# Patient Record
Sex: FEMALE | Race: WHITE | NOT HISPANIC OR LATINO | Employment: OTHER | URBAN - METROPOLITAN AREA
[De-identification: names, ages, dates, MRNs, and addresses within clinical notes are randomized per-mention and may not be internally consistent; named-entity substitution may affect disease eponyms.]

---

## 2023-05-26 LAB
C. DIFFICILE TOXIN, PCR: NOT DETECTED
CAMPYLOBACTER GP: NOT DETECTED
NOROVIRUS GI/GII: NOT DETECTED
ROTAVIRUS A: NOT DETECTED
SALMONELLA SP.: NOT DETECTED
SHIGA TOXIN 1: NOT DETECTED
SHIGA TOXIN 2: NOT DETECTED
SHIGELLA SP.: NOT DETECTED
VIBRIO GRP.: NOT DETECTED
YERSINIA ENTEROCOLITICA: NOT DETECTED

## 2023-06-01 LAB
CRYPTOSPORIDIUM ANTIGEN-DATA CONVERSION: NEGATIVE
GIARDIA LAMBLIA AG-DATA CONVERSION: NEGATIVE
OVA + PARASITE EXAM: NEGATIVE

## 2023-10-02 DIAGNOSIS — I48.91 ATRIAL FIBRILLATION, UNSPECIFIED TYPE (MULTI): Primary | ICD-10-CM

## 2023-10-02 RX ORDER — METOPROLOL TARTRATE 50 MG/1
50 TABLET ORAL 2 TIMES DAILY
Qty: 180 TABLET | Refills: 3 | Status: SHIPPED | OUTPATIENT
Start: 2023-10-02 | End: 2024-10-01

## 2023-10-03 ENCOUNTER — PHARMACY VISIT (OUTPATIENT)
Dept: PHARMACY | Facility: CLINIC | Age: 83
End: 2023-10-03
Payer: MEDICARE

## 2023-10-03 PROCEDURE — RXMED WILLOW AMBULATORY MEDICATION CHARGE

## 2023-10-23 ENCOUNTER — HOSPITAL ENCOUNTER (OUTPATIENT)
Dept: RADIOLOGY | Facility: HOSPITAL | Age: 83
Discharge: HOME | End: 2023-10-23
Payer: MEDICARE

## 2023-10-23 DIAGNOSIS — Z12.31 ENCOUNTER FOR SCREENING MAMMOGRAM FOR MALIGNANT NEOPLASM OF BREAST: ICD-10-CM

## 2023-10-23 PROCEDURE — 77063 BREAST TOMOSYNTHESIS BI: CPT

## 2023-10-23 PROCEDURE — 77063 BREAST TOMOSYNTHESIS BI: CPT | Performed by: RADIOLOGY

## 2023-10-23 PROCEDURE — 77067 SCR MAMMO BI INCL CAD: CPT | Performed by: RADIOLOGY

## 2023-10-23 PROCEDURE — 77067 SCR MAMMO BI INCL CAD: CPT

## 2023-10-25 ENCOUNTER — LAB (OUTPATIENT)
Dept: LAB | Facility: LAB | Age: 83
End: 2023-10-25
Payer: MEDICARE

## 2023-10-25 ENCOUNTER — HOSPITAL ENCOUNTER (OUTPATIENT)
Dept: RADIOLOGY | Facility: HOSPITAL | Age: 83
Discharge: HOME | End: 2023-10-25
Payer: MEDICARE

## 2023-10-25 DIAGNOSIS — R10.2 PELVIC PAIN: ICD-10-CM

## 2023-10-25 DIAGNOSIS — M25.559 HIP PAIN: ICD-10-CM

## 2023-10-25 DIAGNOSIS — R10.30 ABDOMINAL PAIN, LOWER: Primary | ICD-10-CM

## 2023-10-25 LAB
CREAT SERPL-MCNC: 1.06 MG/DL (ref 0.5–1.05)
GFR SERPL CREATININE-BSD FRML MDRD: 52 ML/MIN/1.73M*2

## 2023-10-25 PROCEDURE — 74177 CT ABD & PELVIS W/CONTRAST: CPT

## 2023-10-25 PROCEDURE — 82565 ASSAY OF CREATININE: CPT

## 2023-10-25 PROCEDURE — 74177 CT ABD & PELVIS W/CONTRAST: CPT | Performed by: RADIOLOGY

## 2023-10-25 PROCEDURE — 36415 COLL VENOUS BLD VENIPUNCTURE: CPT

## 2023-10-25 PROCEDURE — 2550000001 HC RX 255 CONTRASTS

## 2023-10-25 RX ADMIN — IOHEXOL 75 ML: 350 INJECTION, SOLUTION INTRAVENOUS at 16:06

## 2023-10-28 ENCOUNTER — PHARMACY VISIT (OUTPATIENT)
Dept: PHARMACY | Facility: CLINIC | Age: 83
End: 2023-10-28
Payer: MEDICARE

## 2023-11-03 PROBLEM — M79.18 MYOFASCIAL PAIN SYNDROME: Status: ACTIVE | Noted: 2023-11-03

## 2023-11-03 PROBLEM — N18.31 CHRONIC KIDNEY DISEASE, STAGE 3A (MULTI): Status: ACTIVE | Noted: 2023-11-03

## 2023-11-03 PROBLEM — M48.062 NEUROGENIC CLAUDICATION DUE TO LUMBAR SPINAL STENOSIS: Status: ACTIVE | Noted: 2023-11-03

## 2023-11-03 PROBLEM — C54.1 ENDOMETRIAL CARCINOMA (MULTI): Status: ACTIVE | Noted: 2023-11-03

## 2023-11-03 PROBLEM — C44.311 BASAL CELL CARCINOMA OF SKIN OF NOSE: Status: ACTIVE | Noted: 2020-02-12

## 2023-11-03 PROBLEM — M81.0 AGE-RELATED OSTEOPOROSIS WITHOUT CURRENT PATHOLOGICAL FRACTURE: Status: ACTIVE | Noted: 2023-11-03

## 2023-11-03 PROBLEM — M54.16 RADICULOPATHY, LUMBAR REGION: Status: ACTIVE | Noted: 2023-11-03

## 2023-11-03 PROBLEM — R07.89 CHEST PRESSURE: Status: ACTIVE | Noted: 2023-11-03

## 2023-11-03 PROBLEM — I10 BENIGN HYPERTENSION: Status: ACTIVE | Noted: 2023-11-03

## 2023-11-03 PROBLEM — N94.9 ADNEXAL CYST: Status: ACTIVE | Noted: 2023-11-03

## 2023-11-03 PROBLEM — C54.9: Status: ACTIVE | Noted: 2023-11-03

## 2023-11-03 PROBLEM — I48.0 PAROXYSMAL ATRIAL FIBRILLATION (MULTI): Status: ACTIVE | Noted: 2023-11-03

## 2023-11-03 PROBLEM — Z85.828 PERSONAL HISTORY OF OTHER MALIGNANT NEOPLASM OF SKIN: Status: ACTIVE | Noted: 2020-02-12

## 2023-11-03 PROBLEM — M48.061 LUMBAR CANAL STENOSIS: Status: ACTIVE | Noted: 2023-11-03

## 2023-11-03 PROBLEM — R82.90 ABNORMAL URINE: Status: ACTIVE | Noted: 2023-11-03

## 2023-11-03 PROBLEM — G89.4 PAIN SYNDROME, CHRONIC: Status: ACTIVE | Noted: 2023-11-03

## 2023-11-03 PROBLEM — K21.9 GASTROESOPHAGEAL REFLUX DISEASE WITHOUT ESOPHAGITIS: Status: ACTIVE | Noted: 2017-10-30

## 2023-11-03 PROBLEM — M51.36 DEGENERATION OF L4-L5 INTERVERTEBRAL DISC: Status: ACTIVE | Noted: 2023-11-03

## 2023-11-03 PROBLEM — R94.31 ABNORMAL EKG: Status: ACTIVE | Noted: 2023-11-03

## 2023-11-03 PROBLEM — M51.369 DEGENERATION OF L4-L5 INTERVERTEBRAL DISC: Status: ACTIVE | Noted: 2023-11-03

## 2023-11-03 PROBLEM — E78.00 PURE HYPERCHOLESTEROLEMIA: Status: ACTIVE | Noted: 2023-11-03

## 2023-11-03 RX ORDER — GLUCOSAMINE/CHONDROITIN/C/MANG 500-400 MG
1 CAPSULE ORAL 2 TIMES DAILY
COMMUNITY

## 2023-11-03 RX ORDER — ASCORBIC ACID 500 MG
TABLET ORAL
COMMUNITY

## 2023-11-03 RX ORDER — NALOXONE HYDROCHLORIDE 4 MG/.1ML
SPRAY NASAL
COMMUNITY
Start: 2020-03-24

## 2023-11-03 RX ORDER — MULTIVITAMIN
TABLET ORAL
COMMUNITY

## 2023-11-03 RX ORDER — HYDROCODONE BITARTRATE AND ACETAMINOPHEN 5; 325 MG/1; MG/1
1 TABLET ORAL 2 TIMES DAILY PRN
COMMUNITY
End: 2023-11-07 | Stop reason: SDUPTHER

## 2023-11-03 RX ORDER — NIRMATRELVIR AND RITONAVIR 300-100 MG
2 KIT ORAL 2 TIMES DAILY
COMMUNITY
Start: 2022-06-27

## 2023-11-03 RX ORDER — PANTOPRAZOLE SODIUM 40 MG/1
40 TABLET, DELAYED RELEASE ORAL DAILY
COMMUNITY

## 2023-11-03 RX ORDER — MAGNESIUM GLUCONATE 27.5 (500)
55 TABLET ORAL 2 TIMES DAILY
COMMUNITY
Start: 2021-01-12

## 2023-11-03 RX ORDER — PROGESTERONE 100 MG/1
1 CAPSULE ORAL DAILY
COMMUNITY
Start: 2010-12-15

## 2023-11-03 RX ORDER — SPIRONOLACTONE 25 MG/1
1 TABLET ORAL DAILY
COMMUNITY
Start: 2019-01-02

## 2023-11-03 RX ORDER — ZINC GLUCONATE 100 MG
1 TABLET ORAL DAILY
COMMUNITY

## 2023-11-03 RX ORDER — CLOTRIMAZOLE AND BETAMETHASONE DIPROPIONATE 10; .64 MG/G; MG/G
1 CREAM TOPICAL 2 TIMES DAILY
COMMUNITY
Start: 2023-05-25

## 2023-11-03 RX ORDER — CALCIUM CARBONATE/VITAMIN D3 600MG-5MCG
1 TABLET ORAL 2 TIMES DAILY
COMMUNITY

## 2023-11-03 RX ORDER — ACETAMINOPHEN 325 MG/1
650 TABLET ORAL EVERY 4 HOURS PRN
COMMUNITY

## 2023-11-03 RX ORDER — PANTOPRAZOLE SODIUM 20 MG/1
20 TABLET, DELAYED RELEASE ORAL
COMMUNITY

## 2023-11-03 RX ORDER — ESTRADIOL 1 MG/1
1 TABLET ORAL DAILY
COMMUNITY
Start: 2010-12-15

## 2023-11-03 RX ORDER — METHOCARBAMOL 750 MG/1
750 TABLET, FILM COATED ORAL 3 TIMES DAILY PRN
COMMUNITY
Start: 2018-05-07 | End: 2023-12-18 | Stop reason: SDUPTHER

## 2023-11-06 NOTE — PROGRESS NOTES
Location of Pain:  Low Back   Pain medication prescribed by us: Norco 5 mg BID  Pain medication prescribed by other provider: no    Any adverse effects from medication: sleepiness  Average pain score with medication (0-10):     1    Percent effective: 90%  Do you take medicine exactly as prescribed? Yes   Functional status (work, disability, retired, etc): retired   Ability to manage activities of daily living: yes   Overall quality of family/social life with the current treatment (below average, average or above average): above average  ER visit since last office visit: no  New medical issues since last office visit: no  Participating in (PT, Chiropractor, Tens Unit, Acupuncture, Aqua Therapy, Home Exercise): active    Last Controlled Substance Contract date: 05/02/2023  Last toxicology date:          08/072023         Consistent with prescribed meds: yes   OARRS reviewed: yes   Daily MME:  10.00  Yearly Narcan prescribed: 2023

## 2023-11-06 NOTE — PROGRESS NOTES
"Patient ID: Chel Lowe is a 83 y.o. female.  Referring Physician: No referring provider defined for this encounter.  Primary Care Provider: Edward Rogers MD    Subjective    Interval History:  She sprained her ankle is using a cane, improving, bowel movements and appetite are normal, otherwise she is doing well.        Past medical history:  Hypertension  Osteoporosis  Osteoarthritis  Mild renal insufficiency (GFR 57 in 2020)     Surgical history:   ×1  Removal of parathyroid gland , benign  Abdominoplasty   Shoulder and elbow surgeries.     OB/GYN history:  Hormone replacement therapy beginning in  and continuing to the present time.  Presently on estradiol 1 mg daily, takes 200 mg of progesterone days 1-12 each month.     No recalled history of abnormal Pap smears.     Family history:  No family history of cancer.  She is an only child.    Objective    BSA: 1.77 meters squared  /79 (BP Location: Left arm, Patient Position: Sitting, BP Cuff Size: Adult)   Pulse 76   Temp 36.1 °C (97 °F) (Temporal)   Resp 17   Ht (S) 1.626 m (5' 4.02\")   Wt 69.7 kg (153 lb 10.6 oz)   SpO2 97%   BMI 26.36 kg/m²      Physical Exam  Constitutional:       General: She is not in acute distress.     Appearance: Normal appearance. She is normal weight. She is not toxic-appearing.   HENT:      Head: Normocephalic.      Mouth/Throat:      Mouth: Mucous membranes are moist.      Pharynx: Oropharynx is clear.   Eyes:      Extraocular Movements: Extraocular movements intact.      Conjunctiva/sclera: Conjunctivae normal.      Pupils: Pupils are equal, round, and reactive to light.   Cardiovascular:      Rate and Rhythm: Normal rate and regular rhythm.      Heart sounds: Normal heart sounds. No murmur heard.     No friction rub. No gallop.   Pulmonary:      Effort: Pulmonary effort is normal.      Breath sounds: Normal breath sounds. No wheezing or rhonchi.   Abdominal:      General: Abdomen is " flat. Bowel sounds are normal. There is no distension.      Palpations: Abdomen is soft.      Tenderness: There is no abdominal tenderness.   Genitourinary:     Comments: Normal external female genitalia without lesions or masses  Speculum exam: Smooth vagina without lesions or masses, radiation changes noted in the vagina.  Bimanual exam: smooth vagina without lesions or masses, surgically absent uterus, cervix, adnexa  Rectovaginal exam: Smooth rectovaginal septum without lesions or masses    Musculoskeletal:         General: No swelling. Normal range of motion.      Cervical back: Normal range of motion.   Skin:     General: Skin is warm and dry.   Neurological:      General: No focal deficit present.      Mental Status: She is alert and oriented to person, place, and time.   Psychiatric:         Mood and Affect: Mood normal.         Behavior: Behavior normal.         Performance Status:  Asymptomatic    Assessment/Plan     Oncology History Overview Note   Tumor History:   12/20: [facility] BSO, sentinel lymph nodes  Stage IA grade 3, serous, endometrial cancer.  9% invasion  Lymphatic vessel invasion present  MMR intact  Her2 expression negative  1/2021-2/2021: Chemo started, HSR to taxolx2 with change to abraxane     Endometrial carcinoma (CMS/HCC)   11/3/2023 Initial Diagnosis    Endometrial carcinoma (CMS/HCC)       83 y.o.  female with history of Stage IA grade 3, serous endometrial cancer s/p [facility]/BSO, SLN excision on 12/14/20 followed by adjuvant therapy with  2 cycles carboplatin and taxol and 1 cycle carboplatin and abraxane completed 2/23/21. She completed vaginal brachytherapy at [facility] on 4/8/21.      # Endometrial cancer  - Physical examination was within normal limits today, no evidence of recurrence   - We reviewed signs and symptoms of possible recurrence with the patient and she will call our office should she experience any of these  - Follow up in 6 months         Scribe Attestation  By signing my name  below, I, Mike George   attest that this documentation has been prepared under the direction and in the presence of Marjan Mercedes MD.     Provider Attestation - Scribe documentation    All medical record entries made by the Scribe were at my direction and personally dictated by me. I have reviewed the chart and agree that the record accurately reflects my personal performance of the history, physical exam, discussion and plan.    Marjan Mercedes MD

## 2023-11-07 ENCOUNTER — OFFICE VISIT (OUTPATIENT)
Dept: GYNECOLOGIC ONCOLOGY | Facility: CLINIC | Age: 83
End: 2023-11-07
Payer: MEDICARE

## 2023-11-07 ENCOUNTER — OFFICE VISIT (OUTPATIENT)
Dept: PAIN MEDICINE | Facility: CLINIC | Age: 83
End: 2023-11-07
Payer: MEDICARE

## 2023-11-07 VITALS
BODY MASS INDEX: 26.23 KG/M2 | OXYGEN SATURATION: 97 % | TEMPERATURE: 97 F | DIASTOLIC BLOOD PRESSURE: 79 MMHG | HEART RATE: 76 BPM | SYSTOLIC BLOOD PRESSURE: 127 MMHG | WEIGHT: 153.66 LBS | RESPIRATION RATE: 17 BRPM | HEIGHT: 64 IN

## 2023-11-07 VITALS
SYSTOLIC BLOOD PRESSURE: 141 MMHG | HEIGHT: 64 IN | DIASTOLIC BLOOD PRESSURE: 74 MMHG | HEART RATE: 74 BPM | BODY MASS INDEX: 26.61 KG/M2 | RESPIRATION RATE: 16 BRPM

## 2023-11-07 DIAGNOSIS — Z79.891 ENCOUNTER FOR LONG-TERM OPIATE ANALGESIC USE: ICD-10-CM

## 2023-11-07 DIAGNOSIS — M51.26 DISPLACEMENT OF LUMBAR INTERVERTEBRAL DISC WITHOUT MYELOPATHY: ICD-10-CM

## 2023-11-07 DIAGNOSIS — M51.36 DEGENERATION OF L4-L5 INTERVERTEBRAL DISC: ICD-10-CM

## 2023-11-07 DIAGNOSIS — M81.0 AGE-RELATED OSTEOPOROSIS WITHOUT CURRENT PATHOLOGICAL FRACTURE: Primary | ICD-10-CM

## 2023-11-07 DIAGNOSIS — Z85.42 ENCOUNTER FOR FOLLOW-UP SURVEILLANCE OF UTERINE CANCER: ICD-10-CM

## 2023-11-07 DIAGNOSIS — M48.062 NEUROGENIC CLAUDICATION DUE TO LUMBAR SPINAL STENOSIS: ICD-10-CM

## 2023-11-07 DIAGNOSIS — Z08 ENCOUNTER FOR FOLLOW-UP SURVEILLANCE OF UTERINE CANCER: ICD-10-CM

## 2023-11-07 DIAGNOSIS — C54.1 ENDOMETRIAL CARCINOMA (MULTI): Primary | ICD-10-CM

## 2023-11-07 DIAGNOSIS — M47.816 LUMBAR SPONDYLOSIS: ICD-10-CM

## 2023-11-07 PROCEDURE — 1160F RVW MEDS BY RX/DR IN RCRD: CPT | Performed by: STUDENT IN AN ORGANIZED HEALTH CARE EDUCATION/TRAINING PROGRAM

## 2023-11-07 PROCEDURE — 99213 OFFICE O/P EST LOW 20 MIN: CPT | Performed by: NURSE PRACTITIONER

## 2023-11-07 PROCEDURE — 1159F MED LIST DOCD IN RCRD: CPT | Performed by: NURSE PRACTITIONER

## 2023-11-07 PROCEDURE — 1036F TOBACCO NON-USER: CPT | Performed by: STUDENT IN AN ORGANIZED HEALTH CARE EDUCATION/TRAINING PROGRAM

## 2023-11-07 PROCEDURE — 3078F DIAST BP <80 MM HG: CPT | Performed by: NURSE PRACTITIONER

## 2023-11-07 PROCEDURE — 1160F RVW MEDS BY RX/DR IN RCRD: CPT | Performed by: NURSE PRACTITIONER

## 2023-11-07 PROCEDURE — 99214 OFFICE O/P EST MOD 30 MIN: CPT | Mod: PO | Performed by: STUDENT IN AN ORGANIZED HEALTH CARE EDUCATION/TRAINING PROGRAM

## 2023-11-07 PROCEDURE — 3077F SYST BP >= 140 MM HG: CPT | Performed by: NURSE PRACTITIONER

## 2023-11-07 PROCEDURE — 1126F AMNT PAIN NOTED NONE PRSNT: CPT | Performed by: STUDENT IN AN ORGANIZED HEALTH CARE EDUCATION/TRAINING PROGRAM

## 2023-11-07 PROCEDURE — 3078F DIAST BP <80 MM HG: CPT | Performed by: STUDENT IN AN ORGANIZED HEALTH CARE EDUCATION/TRAINING PROGRAM

## 2023-11-07 PROCEDURE — 3074F SYST BP LT 130 MM HG: CPT | Performed by: STUDENT IN AN ORGANIZED HEALTH CARE EDUCATION/TRAINING PROGRAM

## 2023-11-07 PROCEDURE — 99214 OFFICE O/P EST MOD 30 MIN: CPT | Performed by: STUDENT IN AN ORGANIZED HEALTH CARE EDUCATION/TRAINING PROGRAM

## 2023-11-07 PROCEDURE — 1159F MED LIST DOCD IN RCRD: CPT | Performed by: STUDENT IN AN ORGANIZED HEALTH CARE EDUCATION/TRAINING PROGRAM

## 2023-11-07 PROCEDURE — 1125F AMNT PAIN NOTED PAIN PRSNT: CPT | Performed by: NURSE PRACTITIONER

## 2023-11-07 PROCEDURE — 1036F TOBACCO NON-USER: CPT | Performed by: NURSE PRACTITIONER

## 2023-11-07 RX ORDER — HYDROCODONE BITARTRATE AND ACETAMINOPHEN 5; 325 MG/1; MG/1
1 TABLET ORAL 2 TIMES DAILY PRN
Qty: 56 TABLET | Refills: 0 | Status: SHIPPED | OUTPATIENT
Start: 2023-12-05 | End: 2024-01-29 | Stop reason: SDUPTHER

## 2023-11-07 RX ORDER — HYDROCODONE BITARTRATE AND ACETAMINOPHEN 5; 325 MG/1; MG/1
1 TABLET ORAL 2 TIMES DAILY PRN
Qty: 56 TABLET | Refills: 0 | Status: SHIPPED | OUTPATIENT
Start: 2024-01-02 | End: 2024-04-23 | Stop reason: SDUPTHER

## 2023-11-07 RX ORDER — HYDROCODONE BITARTRATE AND ACETAMINOPHEN 5; 325 MG/1; MG/1
1 TABLET ORAL 2 TIMES DAILY PRN
Qty: 56 TABLET | Refills: 0 | Status: SHIPPED | OUTPATIENT
Start: 2023-11-07 | End: 2024-01-29 | Stop reason: SDUPTHER

## 2023-11-07 ASSESSMENT — ENCOUNTER SYMPTOMS
WEAKNESS: 1
OCCASIONAL FEELINGS OF UNSTEADINESS: 0
HEADACHES: 0
TREMORS: 0
PSYCHIATRIC NEGATIVE: 1
LIGHT-HEADEDNESS: 0
BACK PAIN: 1
MYALGIAS: 1
SEIZURES: 0
ALLERGIC/IMMUNOLOGIC NEGATIVE: 1
CONSTITUTIONAL NEGATIVE: 1
FACIAL ASYMMETRY: 0
DIZZINESS: 0
CARDIOVASCULAR NEGATIVE: 1
ARTHRALGIAS: 1
SPEECH DIFFICULTY: 0
JOINT SWELLING: 1
HEMATOLOGIC/LYMPHATIC NEGATIVE: 1
NUMBNESS: 0
RESPIRATORY NEGATIVE: 1
ENDOCRINE NEGATIVE: 1
DEPRESSION: 0
GASTROINTESTINAL NEGATIVE: 1
LOSS OF SENSATION IN FEET: 0

## 2023-11-07 ASSESSMENT — PAIN SCALES - GENERAL
PAINLEVEL: 0-NO PAIN
PAINLEVEL: 1

## 2023-11-07 NOTE — PROGRESS NOTES
Subjective   Patient ID: Chel Lowe is a 83 y.o. female.  HPI    Chel follows up for interval reevaluation of her low back pain from lumbar degenerative disc disease and lumbar stenosis.     Norco 5 mg twice daily as needed helps to reduce the low pain improve the function up to 90 %. Average pain score the medication is 1 out of 10 and it is a dull pain to the low back. She does have intermittent paresthesias to the left anterolateral thigh and is unsure how or when this comes on. Low back pain increases with prolonged activity of standing and sitting and improves with position change in medication.     She occasionally has some sleepiness after taking the medication but that is to be expected. Otherwise she is able to manage her ADLs with improved function from the medication. She has an average quality of family and social life with current condition and treatment. Denies go to the ED since last visit for pain.     Does have a right ankle sprain due to a misstep a week ago.  Is able to weight-bear.  So far improving.     Toxicology consistent August 7, 2023.  Annual controlled substance agreement and opioid risk tool are completed and scanned into the chart May 2, 2023.     Chel is with history of 3 chemotherapy and 3 radiation treatments for endometrial cancer. She will be going to oncology for surveillance 4 times a year for the next 2 years. Reports she is doing very well today.     Denies any new back pain. Is without any pain to the legs, numbness weakness or tingling. No falls. No bowel or bladder changes.     Did go to the ED was hospitalized for SVT June 29 through June 30, 2022 and now is taking Xarelto for atrial fibrillation.     For continuity:   Given the patient's report of reduced pain and improved functional ability without adverse effects, it is reasonable to treat with narcotic medications. The terms of the opioid agreement as well as the potential risks and adverse effects of the patient's  medication regimen were discussed in detail. This includes if applicable due to dosage of medication permission to discuss and coordinate care with other treatment providers relevant to the patients condition. The patient verbalized understanding.      Risks and side effects of chronic opioid therapy including but not limited to tolerance, dependence, constipation, hyperalgesia, cognitive side effects, addiction and possible death due to overuse and or misuse were discussed. I also discussed that such medications when co-administered with other sedative agents including but not limited to alcohol, benzodiazepines, sedative hypnotics and illegal drugs could pose life threatening consequences including death. I also explained the impact that the administration of such medication has on a patient with obstructive sleep apnea and continued recommendations for use of apnea devices if ordered are prescribed by other physicians. In order to effectively and safely treat the pain, I also emphasized the importance of compliance with the treatment plan, as well as compliance with the terms of the opioid agreement, which was reviewed in detail. I explained the importance of being responsible with the medications and to take these only as prescribed, never in excess and never for reasons other than pain reduction. The patient was counseled on keeping the medications safe and locked away from children and other adults as well as disposal methods and options. The patient understood the risks and instructions.      I also discussed with the patient in detail that based on the clinical response to the opioid medications and improvements of activities of daily living, sleep, and work performance in light of compliance with the treatment plan we can continue this form of therapy for the above chronic pain. The goal and rationale used for current treatment with chronic opioid medication is to control the pain and alleviate disability  induced by the chronic pain condition noted above after failures of other non-opioid and nonpharmacological modalities to treat the chronic pain and the symptoms associated with have failed. The patient understood the goals in terms of the above treatment plan and had no further questions prior to leaving the office today.      Of note, the above-mentioned diagnoses/conditions and expected fluctuating nature of pain, and pain characteristic changes may lead to prolonged functional impairment requiring frequent and multiple reassessments with continued high level medical decision making. As noted, medication and medication management may require opiate therapy in excess of a routine less than 30 day medication requirement. The patient may require daily opiate therapy necessitating month-long prescription medication as noted above in order to perform activities of daily living and achieve acceptable quality of life with respect to their chronic pain condition for the foreseeable future. We monitor our patient's carefully through drug monitoring, medication counts, urine drug testing specific to their medication as well as a myriad of other substances and with frequent follow-ups with interval reassement of the chronic pain condition, its pathophysiology and prognosis.      The level of clinical decision making at this office visit is high due to high risks and complications including mortality and morbidity related to acute and chronic pain with respects to life, bodily function, and treatment. Risks and clinical decisions with respect to under treatment, failure to maintain adequate treatment, and/or overtreatment complications and outcomes were discussed with the patient with respect to their chronic pain conditions, interventional therapies, as well as the use of various medications including possible controlled/dangerous medications. The amount and complexity of reviewed data at this in subsequent office visits is  high given patient's fluctuating clinical presentation, laboratory and radiographic reports, prescription monitoring program data, and medication history as well as other relevant data as noted above. Pertinent negatives and positives data was used in consideration for the above-mentioned high complexity.       Given the patient's total MED, general use of daily opiates, or other coadministered medications in various classes the patient was offered a prescription for Narcan. I instructed the patient that it is important that patient fill this medication in order to demonstrate understanding of the gravity of possible side effects including respiratory depression and risk of overdose of this opiate load or medication combination. As such patient will be required to bring Narcan prescription to follow-up appointments as part of compliance with continued opiate care.      With respect to opiate induced constipation I discussed multiple ways to combat this problem including staying hydrated and taking over-the-counter medications such as Dulcolax, Miralax and Senna. If these treatments are not effective we could consider such medications as Amitiza, Linzess and Movantik.      Disclaimer: This note was transcribed using an audio transcription device. As such, minor errors may be present with regard to spelling, punctuation, and inadvertent word insertion. Please disregard such errors.     Results/Data  Dexa Bone Density Axial Skeleton 30Nov2022 02:15PM Non Ambulatory, Provider   Ordering Provider: CHRIS SEALS [id number]      Test Name Result Flag Reference   Dexa Bone Density Axial Skeleton ([facility] Only) (Report)       FINAL REPORT  Interpreted by: MARTHA MOHAN LEE, MD   11/30/22 23:18  =================================================================       Bone Density and Vertebral Assessment Report        =================================================================     Height:      65.0 in  Weight:      150.0  lb  Fractures:  Treatments:     -----------------------------------------------------------------     Indication: osteopenia; monitoring treatment;     Referring Provider: CHRIS SEALS     Study: Bone densitometry and vertebral deformity assessment      were performed.     Exam Date: November 30, 2022     Accession number: [id number]     Findings: Standard measurements were obtained utilizing a Dual   Energy X-ray Absorptiometry bone densitometer. Data obtained   includes planar bone density measurements over the Left Femur   and Lumbar Spine. Comparison of measured data and standardized   mean data for a young adult population (when peak bone mass   occurs) results in a T score. This represents the number of   standard deviations above or below the mean of a young adult   population. Comparison of measured data to standards from an   age adjusted population similarly yields a Z score.            Bone Density:  -----------------------------------------------------------------  Region          BMD  T-score Z-score  Classification  -----------------------------------------------------------------  AP Spine(L1-L4)     1.031  -0.1   2.6    Normal  Femoral Neck (Left)   0.577  -2.5   0.0    Osteoporosis  Total Hip (Left)     0.683  -2.1   0.1    Osteopenia  -----------------------------------------------------------------     World Health Organization criteria for BMD impression   classify patients as:   Normal (T-score at or above -1.0),   Osteopenia (T-score between -1.0 and -2.5), or   Osteoporosis (T-score at or below -2.5).      10-year Fracture Risk:  -----------------------------------------------------------------  FRAX not reported because:   Some T-score for Spine Total or Hip Total or Femoral Neck at   or below -2.5    Treated for osteoporosis  -----------------------------------------------------------------        Vertebral Deformity Assessment: Exam date  11/30/2022  -----------------------------------------------------------------  Vertebral Level              Impression  -----------------------------------------------------------------  T4                    Normal  T5                    Mild Wedge Deformity  T6                    Normal  T7                    Normal  T8                    Moderate Wedge   Deformity   T9                    Moderate Wedge   Deformity   T10                    Normal  T11                    Normal  T12                    Normal  L1                    Normal  L2                    Normal  L3                    Normal  L4                    Mild Biconcave   Deformity   -----------------------------------------------------------------  A spine fracture indicates 5X risk for subsequent spine fracture   and 2X risk for subsequent hip fracture.           Previous Exams:  -----------------------------------------------------------------  Region Exam   Age BMD  T-score BMD Change   BMD Change       Date     g/cm2     vs Baseline  vs Previous  -----------------------------------------------------------------  AP Spine (L1-L4)     11/30/2022 82 1.031  -0.1   -2.3%*     -2.3%*       11/18/2020 80 1.055   0.1                     Total Hip(Left)     11/30/2022 82 0.683  -2.1   -4.4%*     -4.4%*       11/18/2020 80 0.714  -1.9                  -----------------------------------------------------------------  *Denotes significance at 95% confidence level, LSC for AP Spine   = 0.022 g/cm2, LSC for Total Hip = 0.027 g/cm2          ImpressionThe patient has osteoporosis as determined by WHO   criteria. Based on the results of the patient?s bone density   assessment, the risk of future fracture increases approximately   two fold for each 1.0 SD decrease in T-score.   However, low BMD is not the only risk factor for a future   fragility fracture. Other clinical risk factors for   osteoporotic fracture should be considered in ascertaining this    patient?s future fracture risk including the patient?s age,   previous osteoporotic (fragility) fracture, estrogen   deficiency/hypogonadal, risk of falling, use of medications   implicated in bone loss (glucocorticoids), family history of   osteoporotic fracture, diseases and conditions associated with   bone loss, low body weight, smoking, high bone turnover, etc.   Combining low BMD and other clinical risk factors result in a   more precise assessment of future fracture risk. Secondary   causes for osteoporosis, such as osteomalacia, other metabolic   bone disorders, and diseases and conditions that may contribute   to accelerated bone loss may have to be considered depending on   the clinical situation.   A repeat bone density assessment should be considered in two   years.      Patient has a Mild Wedge Deformity fracture of vertebra T5.   Patient has a Moderate Wedge Deformity fracture of vertebra T8.   Patient has a Moderate Wedge Deformity fracture of vertebra T9.   Patient has a Mild Biconcave Deformity fracture of vertebra L4.         All images and detailed analysis are available on the    Radiology PACS.   Reported by: Emanuel on 11/30/2022 2:29:00 PM.     Electronically signed by: MARTHA MOHAN  11/30/22 23:18      MRI Knee without Contrast 05Jan2016 11:42AM Non Ambulatory, Provider   Ordering Provider: CHRIS SEALS [id number]      Test Name Result Flag Reference   MRI Knee without Contrast (Report)       Interpreted by: AFIA  01/05/16 17:11  EXAMINATION: MRI of the left knee     INDICATION: LT KNEE PAIN 5YEARS METAL RT THIGH.     COMPARISON: Left knee radiographs 12/1/2015.     TECHNIQUE: Multiplanar multisequence imaging of the left knee was   performed without use of intravenous gadolinium.     FINDINGS:      There is a complex tear of the posterior horn of the medial meniscus   with a small flap component displaced superiorly and medially. The   lateral meniscus appears intact. The cruciate and  collateral   ligaments are intact. The posterior lateral supporting structures   appear intact. The extensor mechanism appears intact.     There is tricompartmental thinning and fibrillation of the articular   cartilage without sizable full-thickness defect. Reactive marrow   signal changes are identified in the medial aspect of the medial   tibial plateau.     The bone marrow signal is within normal limits without evidence for   fracture or marrow replacing process. There is no dislocation.   There is no sizable joint effusion.     There is a very small Baker's cyst without evidence for rupture.   Mild prepatellar soft tissue edema is nonspecific.     IMPRESSION:     1. Complex tear of the posterior horn of the medial meniscus with   small flap component displaced superomedially.  2. Tricompartmental articular cartilage thinning and fibrillation   without sizable full-thickness defect.  Transcribed by: Eze SocialSci  Electronically signed by: AFIA  01/05/16 17:11      Xray Knee 3 View [id number] 04:03PM Non Ambulatory, Provider   Ordering Provider: CHRIS SEALS [id number]      Test Name Result Flag Reference   Xray Knee 3 View (Report)       Interpreted by: GERMAN CARTAGENA  12/02/15 09:10  X-ray left knee: 5 views.     CLINICAL: Pain     FINDINGS: Osteopenia is present. Spurring of patella and tibial   spines is seen. Calcifications of the medial and lateral menisci are   identified.     No fracture or dislocation is seen. No periosteal reaction is noted.     There is no evidence of suprapatellar effusion.     IMPRESSION:     Spurring of patella and tibial spines.     Chondrocalcinosis, which may be seen with CPPD arthropathy.     Osteopenia.  Transcribed by: Eze SocialSci  Electronically signed by: Eze Eyeotasindhu  12/02/15 09:10      MRI L Spine without Contrast 22Jan2013 04:12PM Brian Reyes      Test Name Result Flag Reference   MRI L Spine without Contrast (Report)       Interpreted  by: COLLINS HALL  01/23/13 13:08  MR scan of the lumbar spine dated 1/22/2013     Clinical history: Left lower extremity pain.     Protocol: T1 weighted, STIR and turbo T2 weighted sagittal and axial   images of the lumbar spine were obtained. No comparison studies.   Study interpreted [facility].     Findings: There is grade 1 anterolisthesis of L2 on L3. There is also   suggestion of very subtle anterolisthesis of L5 on S1 . Alignment is   otherwise intact. There are varying degrees of degenerative disc   space narrowing and hypertrophic spurring throughout the lumbar   spine, most severe at levels L3/4 and L4/5. Marrow signal is somewhat   mottled in appearance but otherwise within the range of normal. The   conus is normal in caliber and signal. The conus terminates at the   L1/2 level, which is normal. No focal abnormalities are appreciated   at the T12/L1 level. At the L1/L2 level, there is mild effacement of   the thecal sac by disc bulging, facet hypertrophy ligamentum flavum   hypertrophy. There is no significant canal stenosis. There is no   significant foraminal stenosis. At the L2/3 level, there is mild   canal stenosis caused by the previously noted anterolisthesis, disc   bulging, facet hypertrophy ligamentum flavum hypertrophy. The disc   bulge is asymmetric to the left. Some degree of annular disruption is   therefore not excluded. There is mild left foraminal encroachment. At   the L3/4 level, there is minimal canal stenosis caused by   circumferential disc bulging, facet hypertrophy and ligamentum flavum   hypertrophy. There is mild bilateral foraminal encroachment. At the   L4/5 level, there is circumferential disc bulging with a superimposed   small, broad-based central disc herniation. In addition, there is   mild ligamentum flavum hypertrophy at this level. There is mild   bilateral foraminal encroachment. At the L5/S1 level, there is mild   circumferential  disc bulging and spurring causing effacement of the   ventral thecal sac. There is also bilateral facet hypertrophy,   greater on the right than on the left. There is no significant   foraminal stenosis, however. Note is made of a 6 mm synovial cyst   arising from the right facet joint at this level. Cyst projects   laterally, however; it does not encroach upon the foramen or canal.     Impression: Mild to moderate degenerative changes throughout the   lumbar spine, including grade 1 anterolisthesis of L2 on L3 and,   possibly, minimal anterolisthesis of L5 on S1. Associated mild canal   stenosis at the L2/3 level. Minimal canal stenosis at the L3/4 level.   Small, broad-based central disc herniation superimposed on disc bulge   at the L4/5 level.  Transcribed by: Eze AdsIt  Electronically signed by: Eze Tresoritbrenda  01/23/13 13:08      Review of Systems   Constitutional: Negative.    Respiratory: Negative.     Cardiovascular: Negative.    Gastrointestinal: Negative.    Endocrine: Negative.    Genitourinary: Negative.    Musculoskeletal:  Positive for arthralgias, back pain, gait problem, joint swelling and myalgias.   Allergic/Immunologic: Negative.    Neurological:  Positive for weakness. Negative for dizziness, tremors, seizures, syncope, facial asymmetry, speech difficulty, light-headedness, numbness and headaches.   Hematological: Negative.    Psychiatric/Behavioral: Negative.         Objective   Physical Exam  Vitals and nursing note reviewed.   Constitutional:       Appearance: Normal appearance.   HENT:      Head: Normocephalic and atraumatic.   Cardiovascular:      Pulses: Normal pulses.   Pulmonary:      Effort: Pulmonary effort is normal. No respiratory distress.   Musculoskeletal:      Right lower leg: No edema.      Left lower leg: No edema.   Skin:     General: Skin is warm and dry.      Capillary Refill: Capillary refill takes 2 to 3 seconds.   Neurological:      Mental Status: She  is alert and oriented to person, place, and time.      Cranial Nerves: No cranial nerve deficit.      Motor: No weakness.      Gait: Gait abnormal.      Comments: Ambulates with antalgic gait and right-sided cane.   Psychiatric:         Mood and Affect: Mood normal.         Behavior: Behavior normal.         Assessment/Plan   Problem List Items Addressed This Visit             ICD-10-CM    Age-related osteoporosis without current pathological fracture - Primary M81.0    Relevant Medications    HYDROcodone-acetaminophen (Norco) 5-325 mg tablet    Neurogenic claudication due to lumbar spinal stenosis M48.062    Relevant Medications    HYDROcodone-acetaminophen (Norco) 5-325 mg tablet    Degeneration of L4-L5 intervertebral disc M51.36    Relevant Medications    HYDROcodone-acetaminophen (Norco) 5-325 mg tablet     Other Visit Diagnoses         Codes    Encounter for long-term opiate analgesic use     Z79.891    Relevant Medications    HYDROcodone-acetaminophen (Norco) 5-325 mg tablet (Start on 12/5/2023)    HYDROcodone-acetaminophen (Norco) 5-325 mg tablet (Start on 1/2/2024)    Displacement of lumbar intervertebral disc without myelopathy     M51.26    Relevant Medications    HYDROcodone-acetaminophen (Norco) 5-325 mg tablet (Start on 12/5/2023)    HYDROcodone-acetaminophen (Norco) 5-325 mg tablet (Start on 1/2/2024)    Lumbar spondylosis     M47.816    Relevant Medications    HYDROcodone-acetaminophen (Norco) 5-325 mg tablet (Start on 12/5/2023)    HYDROcodone-acetaminophen (Norco) 5-325 mg tablet (Start on 1/2/2024)          Follow-up 12 weeks.    Reviewed and approved by JENAE KAUFFMAN on 11/7/23 at 2:43 PM.

## 2023-11-14 ENCOUNTER — APPOINTMENT (OUTPATIENT)
Dept: PHYSICAL THERAPY | Facility: HOSPITAL | Age: 83
End: 2023-11-14
Payer: MEDICARE

## 2023-11-27 ENCOUNTER — PHARMACY VISIT (OUTPATIENT)
Dept: PHARMACY | Facility: CLINIC | Age: 83
End: 2023-11-27
Payer: MEDICARE

## 2023-11-27 PROCEDURE — RXMED WILLOW AMBULATORY MEDICATION CHARGE

## 2023-11-27 RX ORDER — PANTOPRAZOLE SODIUM 40 MG/1
40 TABLET, DELAYED RELEASE ORAL DAILY
Qty: 90 TABLET | Refills: 3 | OUTPATIENT
Start: 2022-12-07

## 2023-12-05 PROCEDURE — RXMED WILLOW AMBULATORY MEDICATION CHARGE

## 2023-12-08 ENCOUNTER — PHARMACY VISIT (OUTPATIENT)
Dept: PHARMACY | Facility: CLINIC | Age: 83
End: 2023-12-08
Payer: MEDICARE

## 2023-12-18 ENCOUNTER — TELEPHONE (OUTPATIENT)
Dept: PAIN MEDICINE | Facility: CLINIC | Age: 83
End: 2023-12-18
Payer: MEDICARE

## 2023-12-18 DIAGNOSIS — M79.18 MYOFASCIAL PAIN SYNDROME: Primary | ICD-10-CM

## 2023-12-18 DIAGNOSIS — M54.32 SCIATICA OF LEFT SIDE: ICD-10-CM

## 2023-12-18 RX ORDER — PREDNISONE 20 MG/1
20 TABLET ORAL 2 TIMES DAILY
Qty: 10 TABLET | Refills: 0 | Status: SHIPPED | OUTPATIENT
Start: 2023-12-18 | End: 2023-12-23

## 2023-12-18 RX ORDER — METHOCARBAMOL 750 MG/1
750 TABLET, FILM COATED ORAL 3 TIMES DAILY PRN
Qty: 90 TABLET | Refills: 2 | Status: SHIPPED | OUTPATIENT
Start: 2023-12-18 | End: 2024-03-17

## 2023-12-18 NOTE — TELEPHONE ENCOUNTER
Patient is having sciatic pain Lt. Leg buttocks to heel. Her next OV is with Dr RAJPUT 1-29. Wondering if a steroid could be called in to help till OV. Please call patient and advise  Tiara

## 2023-12-30 ENCOUNTER — LAB REQUISITION (OUTPATIENT)
Dept: LAB | Facility: HOSPITAL | Age: 83
End: 2023-12-30
Payer: MEDICARE

## 2023-12-30 ENCOUNTER — HOSPITAL ENCOUNTER (OUTPATIENT)
Dept: RADIOLOGY | Facility: HOSPITAL | Age: 83
Discharge: HOME | End: 2023-12-30
Payer: MEDICARE

## 2023-12-30 DIAGNOSIS — M25.561 RIGHT KNEE PAIN: ICD-10-CM

## 2023-12-30 DIAGNOSIS — E78.5 HYPERLIPIDEMIA, UNSPECIFIED: ICD-10-CM

## 2023-12-30 DIAGNOSIS — I10 ESSENTIAL (PRIMARY) HYPERTENSION: ICD-10-CM

## 2023-12-30 LAB
ALBUMIN SERPL BCP-MCNC: 3.9 G/DL (ref 3.4–5)
ALP SERPL-CCNC: 37 U/L (ref 33–136)
ALT SERPL W P-5'-P-CCNC: 21 U/L (ref 7–45)
ANION GAP SERPL CALC-SCNC: 13 MMOL/L (ref 10–20)
AST SERPL W P-5'-P-CCNC: 23 U/L (ref 9–39)
BASOPHILS # BLD AUTO: 0.03 X10*3/UL (ref 0–0.1)
BASOPHILS NFR BLD AUTO: 0.4 %
BILIRUB SERPL-MCNC: 1.1 MG/DL (ref 0–1.2)
BUN SERPL-MCNC: 18 MG/DL (ref 6–23)
CALCIUM SERPL-MCNC: 8.8 MG/DL (ref 8.6–10.3)
CHLORIDE SERPL-SCNC: 104 MMOL/L (ref 98–107)
CHOLEST SERPL-MCNC: 165 MG/DL (ref 0–199)
CHOLESTEROL/HDL RATIO: 3.4
CO2 SERPL-SCNC: 22 MMOL/L (ref 21–32)
CREAT SERPL-MCNC: 1.02 MG/DL (ref 0.5–1.05)
EOSINOPHIL # BLD AUTO: 0.22 X10*3/UL (ref 0–0.4)
EOSINOPHIL NFR BLD AUTO: 3.1 %
ERYTHROCYTE [DISTWIDTH] IN BLOOD BY AUTOMATED COUNT: 12.8 % (ref 11.5–14.5)
GFR SERPL CREATININE-BSD FRML MDRD: 55 ML/MIN/1.73M*2
GLUCOSE SERPL-MCNC: 112 MG/DL (ref 74–99)
HCT VFR BLD AUTO: 44.3 % (ref 36–46)
HDLC SERPL-MCNC: 48.4 MG/DL
HGB BLD-MCNC: 14.2 G/DL (ref 12–16)
IMM GRANULOCYTES # BLD AUTO: 0.03 X10*3/UL (ref 0–0.5)
IMM GRANULOCYTES NFR BLD AUTO: 0.4 % (ref 0–0.9)
LDLC SERPL CALC-MCNC: 92 MG/DL
LYMPHOCYTES # BLD AUTO: 1.96 X10*3/UL (ref 0.8–3)
LYMPHOCYTES NFR BLD AUTO: 27.5 %
MCH RBC QN AUTO: 32.1 PG (ref 26–34)
MCHC RBC AUTO-ENTMCNC: 32.1 G/DL (ref 32–36)
MCV RBC AUTO: 100 FL (ref 80–100)
MONOCYTES # BLD AUTO: 0.66 X10*3/UL (ref 0.05–0.8)
MONOCYTES NFR BLD AUTO: 9.3 %
NEUTROPHILS # BLD AUTO: 4.23 X10*3/UL (ref 1.6–5.5)
NEUTROPHILS NFR BLD AUTO: 59.3 %
NON HDL CHOLESTEROL: 117 MG/DL (ref 0–149)
NRBC BLD-RTO: 0 /100 WBCS (ref 0–0)
PLATELET # BLD AUTO: 177 X10*3/UL (ref 150–450)
POTASSIUM SERPL-SCNC: 4.6 MMOL/L (ref 3.5–5.3)
PROT SERPL-MCNC: 6.3 G/DL (ref 6.4–8.2)
RBC # BLD AUTO: 4.43 X10*6/UL (ref 4–5.2)
SODIUM SERPL-SCNC: 134 MMOL/L (ref 136–145)
TRIGL SERPL-MCNC: 124 MG/DL (ref 0–149)
VLDL: 25 MG/DL (ref 0–40)
WBC # BLD AUTO: 7.1 X10*3/UL (ref 4.4–11.3)

## 2023-12-30 PROCEDURE — 80061 LIPID PANEL: CPT

## 2023-12-30 PROCEDURE — 80053 COMPREHEN METABOLIC PANEL: CPT

## 2023-12-30 PROCEDURE — 73564 X-RAY EXAM KNEE 4 OR MORE: CPT | Mod: RT

## 2023-12-30 PROCEDURE — 73564 X-RAY EXAM KNEE 4 OR MORE: CPT | Mod: RIGHT SIDE | Performed by: RADIOLOGY

## 2023-12-30 PROCEDURE — 85025 COMPLETE CBC W/AUTO DIFF WBC: CPT

## 2024-01-16 ENCOUNTER — APPOINTMENT (OUTPATIENT)
Dept: PAIN MEDICINE | Facility: CLINIC | Age: 84
End: 2024-01-16
Payer: MEDICARE

## 2024-01-29 ENCOUNTER — OFFICE VISIT (OUTPATIENT)
Dept: PAIN MEDICINE | Facility: CLINIC | Age: 84
End: 2024-01-29
Payer: MEDICARE

## 2024-01-29 VITALS — HEART RATE: 81 BPM | DIASTOLIC BLOOD PRESSURE: 79 MMHG | SYSTOLIC BLOOD PRESSURE: 122 MMHG | RESPIRATION RATE: 16 BRPM

## 2024-01-29 DIAGNOSIS — M51.26 DISPLACEMENT OF LUMBAR INTERVERTEBRAL DISC WITHOUT MYELOPATHY: ICD-10-CM

## 2024-01-29 DIAGNOSIS — M81.0 AGE-RELATED OSTEOPOROSIS WITHOUT CURRENT PATHOLOGICAL FRACTURE: ICD-10-CM

## 2024-01-29 DIAGNOSIS — M51.36 DEGENERATION OF L4-L5 INTERVERTEBRAL DISC: ICD-10-CM

## 2024-01-29 DIAGNOSIS — Z79.891 ENCOUNTER FOR LONG-TERM OPIATE ANALGESIC USE: ICD-10-CM

## 2024-01-29 DIAGNOSIS — M48.062 NEUROGENIC CLAUDICATION DUE TO LUMBAR SPINAL STENOSIS: ICD-10-CM

## 2024-01-29 DIAGNOSIS — M47.816 LUMBAR SPONDYLOSIS: ICD-10-CM

## 2024-01-29 PROCEDURE — 3074F SYST BP LT 130 MM HG: CPT | Performed by: ANESTHESIOLOGY

## 2024-01-29 PROCEDURE — 1036F TOBACCO NON-USER: CPT | Performed by: ANESTHESIOLOGY

## 2024-01-29 PROCEDURE — 3078F DIAST BP <80 MM HG: CPT | Performed by: ANESTHESIOLOGY

## 2024-01-29 PROCEDURE — 99213 OFFICE O/P EST LOW 20 MIN: CPT | Performed by: ANESTHESIOLOGY

## 2024-01-29 PROCEDURE — 1126F AMNT PAIN NOTED NONE PRSNT: CPT | Performed by: ANESTHESIOLOGY

## 2024-01-29 RX ORDER — HYDROCODONE BITARTRATE AND ACETAMINOPHEN 5; 325 MG/1; MG/1
1 TABLET ORAL 2 TIMES DAILY PRN
Qty: 56 TABLET | Refills: 0 | Status: SHIPPED | OUTPATIENT
Start: 2024-01-31 | End: 2024-04-23 | Stop reason: SDUPTHER

## 2024-01-29 RX ORDER — HYDROCODONE BITARTRATE AND ACETAMINOPHEN 5; 325 MG/1; MG/1
1 TABLET ORAL 2 TIMES DAILY PRN
Qty: 56 TABLET | Refills: 0 | Status: SHIPPED | OUTPATIENT
Start: 2024-03-27 | End: 2024-04-23 | Stop reason: SDUPTHER

## 2024-01-29 RX ORDER — HYDROCODONE BITARTRATE AND ACETAMINOPHEN 5; 325 MG/1; MG/1
1 TABLET ORAL 2 TIMES DAILY PRN
Qty: 56 TABLET | Refills: 0 | Status: SHIPPED | OUTPATIENT
Start: 2024-02-28 | End: 2024-03-27

## 2024-01-29 ASSESSMENT — PAIN SCALES - GENERAL
PAINLEVEL_OUTOF10: 0 - NO PAIN
PAINLEVEL: 0-NO PAIN

## 2024-01-29 ASSESSMENT — PAIN - FUNCTIONAL ASSESSMENT: PAIN_FUNCTIONAL_ASSESSMENT: 0-10

## 2024-01-29 ASSESSMENT — ENCOUNTER SYMPTOMS
DEPRESSION: 0
LOSS OF SENSATION IN FEET: 0
OCCASIONAL FEELINGS OF UNSTEADINESS: 0

## 2024-01-29 NOTE — PROGRESS NOTES
Subjective   Patient ID: Chel Lowe is a 83 y.o. female who presents for Med Refill.  HPI    Ms. Lowe is a pleasant 83-year-old female presenting today for medication refill.  She complains of chronic left leg pain.  She states that the pain starts at the left buttock area radiates down to the knee.  She reports that she only experiences the pain she exerts herself, doing house chores.  Her pain is well-controlled with Norco.  5/325 mg twice daily and Robaxin 750 mg 3 times daily.  She reports that she bumped her right knee couple of months ago and had extensive bruising given that she takes Xarelto.  She saw her tabetic surgeon and received a right knee intra-articular steroid injection which provided relief for about a week, x-ray at that time showed no acute pathology.      Chel is with history of 3 chemotherapy and 3 radiation treatments for endometrial cancer. She will be going to oncology for surveillance 4 times a year for the next 2 years. Reports she is doing very well today.     Denies any new back pain. Is without any pain to the legs, numbness weakness or tingling. No falls. No bowel or bladder changes.                  OARRS:  No data recorded      Review of Systems     Current Outpatient Medications   Medication Instructions    acetaminophen (TYLENOL) 650 mg, oral, Every 4 hours PRN    ascorbic acid (Vitamin C) 500 mg tablet oral    calcium carbonate-vitamin D3 600 mg-5 mcg (200 unit) tablet 1 tablet, oral, 2 times daily    clotrimazole-betamethasone (Lotrisone) cream 1 Application, Topical, 2 times daily    estradiol (Estrace) 1 mg tablet 1 tablet, oral, Daily    glucosamine-chondroit-vit C-Mn 500-400 mg capsule 1 capsule, oral, 2 times daily    HYDROcodone-acetaminophen (Norco) 5-325 mg tablet 1 tablet, oral, 2 times daily PRN    HYDROcodone-acetaminophen (Norco) 5-325 mg tablet 1 tablet, oral, 2 times daily PRN    HYDROcodone-acetaminophen (Norco) 5-325 mg tablet 1 tablet, oral, 2 times daily  PRN    magnesium gluconate (Magonate) 27.5 mg magne- sium (500 mg) tablet 55 mg, oral, 2 times daily    methocarbamol (ROBAXIN) 750 mg, oral, 3 times daily PRN    metoprolol tartrate (Lopressor) 50 mg tablet TAKE 1 TABLET BY MOUTH TWO TIMES A DAY    METOPROLOL TARTRATE ORAL oral    multivitamin tablet oral    naloxone (Narcan) 4 mg/0.1 mL nasal spray nasal, Used as directed    nirmatrelvir-ritonavir (Paxlovid) 300 mg (150 mg x 2)-100 mg tablet therapy pack 2 tablets, oral, 2 times daily    pantoprazole (PROTONIX) 20 mg, oral, Daily RT    pantoprazole (PROTONIX) 40 mg, oral, Daily    pantoprazole (PROTONIX) 40 mg, oral, Daily    progesterone (Prometrium) 100 mg capsule 1 capsule, oral, Daily    risedronate (Actonel) 35 mg tablet TAKE 1 TABLET BY MOUTH ONCE WEEKLY- TAKE WITH A FULL GLASS OF WATER ON AN EMPTY STOMACH. DO NOT LIE DOWN, EAT OR DRINK FOR 30 MINUTES.    risedronate (Actonel) 35 mg tablet Take in morning with full glass of water on an empty stomach. No food, drink, meds, or lying down for 30 minutes after.    rivaroxaban (Xarelto) 20 mg tablet TAKE 1 TABLET BY MOUTH ONE TIME DAILY    spironolactone (Aldactone) 25 mg tablet 1 tablet, oral, Daily    spironolactone (ALDACTONE) 50 mg, oral, Daily    zinc gluconate 100 mg tablet 1 tablet, oral, Daily        Past Medical History:   Diagnosis Date    Endometrial cancer (CMS/HCC)         Past Surgical History:   Procedure Laterality Date     SECTION, CLASSIC  2013     Section    HYSTERECTOMY      SHOULDER SURGERY  2013    Shoulder Surgery        No family history on file.     No Known Allergies     Objective     Vitals:    24 1433   BP: 122/79   Pulse:    Resp:         Physical Exam    GENERAL EXAM  Vital Signs: Vital signs to include heart rate, respiration rate, blood pressure, and temperature were reviewed.  General Appearance:  Awake, alert, healthy appearing, well developed, No acute distress.  Head: Normocephalic without  evidence of head injury.  Neck: The appearance of the neck was normal without swelling with a midline trachea.  Eyes: The eyelids and eyebrows exhibited no abnormalities.  Pupils were not pin-point.  Sclera was without icterus.  Lungs: Respiration rhythm and depth was normal.  Respiratory movements were normal without labored breathing.  Cardiovascular: No peripheral edema was present.    Neurological: Patient was oriented to time, place, and person.  Speech was normal.  Balance, gait, and stance were unremarkable.    Psychiatric: Appearance was normal with appropriate dress.  Mood was euthymic and affect was normal.  Skin: Affected regions were without ecchymosis or skin lesions.  Musculoskeletal: Right knee has normal range of motion.          Assessment/Plan        83-year-old female with chronic left leg pain that is well-controlled with Norco 5-325 mg twice daily and Robaxin 750 mg 3 times daily.  Risks and benefits of medications were discussed with the patient.    Plan:  -Physical therapy referral for left knee.  -Prescription refill.  -We discussed repeating intra-articular steroid injection and hyaluronic acid injection we could possibly proceed with hyaluronic acid injection patient did note improvement after physical therapy.    This note was generated with the aid of dictation software, there may be typos despite my attempts at proofreading.

## 2024-01-30 ENCOUNTER — APPOINTMENT (OUTPATIENT)
Dept: PAIN MEDICINE | Facility: CLINIC | Age: 84
End: 2024-01-30
Payer: MEDICARE

## 2024-02-12 ENCOUNTER — PHARMACY VISIT (OUTPATIENT)
Dept: PHARMACY | Facility: CLINIC | Age: 84
End: 2024-02-12
Payer: COMMERCIAL

## 2024-02-12 PROCEDURE — RXMED WILLOW AMBULATORY MEDICATION CHARGE

## 2024-02-12 RX ORDER — PANTOPRAZOLE SODIUM 40 MG/1
40 TABLET, DELAYED RELEASE ORAL DAILY
Qty: 90 TABLET | Refills: 3 | Status: CANCELLED | OUTPATIENT
Start: 2024-02-12

## 2024-02-27 PROCEDURE — RXMED WILLOW AMBULATORY MEDICATION CHARGE

## 2024-02-29 ENCOUNTER — PHARMACY VISIT (OUTPATIENT)
Dept: PHARMACY | Facility: CLINIC | Age: 84
End: 2024-02-29
Payer: COMMERCIAL

## 2024-02-29 PROCEDURE — RXMED WILLOW AMBULATORY MEDICATION CHARGE

## 2024-03-04 RX ORDER — ASPIRIN 81 MG/1
TABLET ORAL
COMMUNITY
Start: 2019-01-02

## 2024-03-04 RX ORDER — GABAPENTIN 300 MG/1
CAPSULE ORAL
COMMUNITY
Start: 2021-01-21

## 2024-03-06 ENCOUNTER — TELEPHONE (OUTPATIENT)
Dept: PAIN MEDICINE | Facility: CLINIC | Age: 84
End: 2024-03-06

## 2024-03-06 ENCOUNTER — OFFICE VISIT (OUTPATIENT)
Dept: CARDIOLOGY | Facility: CLINIC | Age: 84
End: 2024-03-06
Payer: MEDICARE

## 2024-03-06 ENCOUNTER — APPOINTMENT (OUTPATIENT)
Dept: CARDIOLOGY | Facility: CLINIC | Age: 84
End: 2024-03-06
Payer: MEDICARE

## 2024-03-06 VITALS
HEART RATE: 76 BPM | BODY MASS INDEX: 26.12 KG/M2 | SYSTOLIC BLOOD PRESSURE: 121 MMHG | HEIGHT: 64 IN | OXYGEN SATURATION: 97 % | WEIGHT: 153 LBS | DIASTOLIC BLOOD PRESSURE: 73 MMHG

## 2024-03-06 DIAGNOSIS — Z01.810 PREOP CARDIOVASCULAR EXAM: Primary | ICD-10-CM

## 2024-03-06 DIAGNOSIS — I10 BENIGN HYPERTENSION: ICD-10-CM

## 2024-03-06 DIAGNOSIS — I48.0 PAROXYSMAL ATRIAL FIBRILLATION (MULTI): ICD-10-CM

## 2024-03-06 PROCEDURE — 3078F DIAST BP <80 MM HG: CPT | Performed by: STUDENT IN AN ORGANIZED HEALTH CARE EDUCATION/TRAINING PROGRAM

## 2024-03-06 PROCEDURE — 1036F TOBACCO NON-USER: CPT | Performed by: STUDENT IN AN ORGANIZED HEALTH CARE EDUCATION/TRAINING PROGRAM

## 2024-03-06 PROCEDURE — 1159F MED LIST DOCD IN RCRD: CPT | Performed by: STUDENT IN AN ORGANIZED HEALTH CARE EDUCATION/TRAINING PROGRAM

## 2024-03-06 PROCEDURE — 1126F AMNT PAIN NOTED NONE PRSNT: CPT | Performed by: STUDENT IN AN ORGANIZED HEALTH CARE EDUCATION/TRAINING PROGRAM

## 2024-03-06 PROCEDURE — 99215 OFFICE O/P EST HI 40 MIN: CPT | Performed by: STUDENT IN AN ORGANIZED HEALTH CARE EDUCATION/TRAINING PROGRAM

## 2024-03-06 PROCEDURE — 3074F SYST BP LT 130 MM HG: CPT | Performed by: STUDENT IN AN ORGANIZED HEALTH CARE EDUCATION/TRAINING PROGRAM

## 2024-03-06 NOTE — TELEPHONE ENCOUNTER
Patient wanted to have it noted in chart that she is having arthroscopic surgery at [facility] on Monday and some pain medication may be involved for after the surgery.

## 2024-03-06 NOTE — PROGRESS NOTES
Primary Care Physician: Edward Rogers MD   Date of Visit: 03/06/2024  2:20 PM EST  Type of Visit: follow up       Chief Complaint:  Chief Complaint   Patient presents with    New Patient Visit     Here for cardiac clearance for knee surgery        HPI  Chel Lowe 84 y.o. female with hx of PAF. SVT. HTN. Preserved LV systolic function. Normal stress nuc test 2019 follows with Dr. Hamilton, new to me here today for preop risk assessment prior to rt knee surgery for torn meniscus     She feels good and has no active cardiac symptoms  No afib recurrence   Bp is well controlled  Doing well on metoprolol  No dizziness or syncope  No chest pain or sob    Before her knee injury last nov she was able to go up and down stairs and walk with no issues now she uses a cane after th injury     No hx of stroke  No bleeding issues        Review of Systems   Review of Systems   12 points review of systems are negative expect for the above    Social History:  Social History     Socioeconomic History    Marital status:      Spouse name: Not on file    Number of children: Not on file    Years of education: Not on file    Highest education level: Not on file   Occupational History    Not on file   Tobacco Use    Smoking status: Never    Smokeless tobacco: Never   Substance and Sexual Activity    Alcohol use: Never    Drug use: Never    Sexual activity: Not on file   Other Topics Concern    Not on file   Social History Narrative    Not on file     Social Determinants of Health     Financial Resource Strain: Not on file   Food Insecurity: Not on file   Transportation Needs: Not on file   Physical Activity: Not on file   Stress: Not on file   Social Connections: Not on file   Intimate Partner Violence: Not on file   Housing Stability: Not on file        Past Medical History:  Past Medical History:   Diagnosis Date    Endometrial cancer (CMS/[facility])        Past Surgical History:  Past Surgical History:   Procedure Laterality Date     " SECTION, CLASSIC  2013     Section    HYSTERECTOMY      SHOULDER SURGERY  2013    Shoulder Surgery       Family History:  No family history on file.     Objective:       2023    10:19 AM 2023     2:50 PM 2023     1:39 PM 2023     2:46 PM 2024     2:26 PM 2024     2:33 PM 3/6/2024    12:11 PM   Vitals   Systolic 142 133 141 127 170 122 121   Diastolic 83 78 74 79 81 79 73   Heart Rate 72 71 74 76 81  76   Temp    36.1 °C (97 °F)      Resp 17 16 16 17 16     Height (in)   1.626 m (5' 4\") 1.626 m (5' 4.02\")    1.626 m (5' 4\")   Weight (lb)    153.66   153   BMI   26.61 kg/m2 26.36 kg/m2   26.26 kg/m2   BSA (m2)   1.78 m2 1.77 m2   1.77 m2   Visit Report   Report    Report Report    Report Report Report Report       Significant value    Multiple values from one day are sorted in reverse-chronological order      Constitutional:       Appearance: Healthy appearance. Not in distress.   Neck:      Vascular: No JVR. JVD normal.   Pulmonary:      Effort: Pulmonary effort is normal.      Breath sounds: Normal breath sounds. No wheezing. No rhonchi. No rales.   Chest:      Chest wall: Not tender to palpatation.   Cardiovascular:      PMI at left midclavicular line. Normal rate. Regular rhythm. Normal S1. Normal S2.       Murmurs: There is no murmur.      No gallop.  No click. No rub.   Pulses:     Intact distal pulses.   Edema:     Peripheral edema absent.   Abdominal:      General: Bowel sounds are normal.      Palpations: Abdomen is soft.      Tenderness: There is no abdominal tenderness.   Musculoskeletal: Normal range of motion.         General: No tenderness.   Skin:     General: Skin is warm and dry.   Neurological:      General: No focal deficit present.      Mental Status: Alert and oriented to person, place and time.     Allergies:  No Known Allergies    Medications:  Current Outpatient Medications   Medication Instructions    acetaminophen (TYLENOL) 650 mg, " oral, Every 4 hours PRN    ascorbic acid (Vitamin C) 500 mg tablet oral    aspirin 81 mg EC tablet oral, Daily RT    calcium carbonate-vitamin D3 600 mg-5 mcg (200 unit) tablet 1 tablet, oral, 2 times daily    clotrimazole-betamethasone (Lotrisone) cream 1 Application, Topical, 2 times daily    estradiol (Estrace) 1 mg tablet 1 tablet, oral, Daily    gabapentin (Neurontin) 300 mg capsule oral    glucosamine-chondroit-vit C-Mn 500-400 mg capsule 1 capsule, oral, 2 times daily    HYDROcodone-acetaminophen (Norco) 5-325 mg tablet 1 tablet, oral, 2 times daily PRN    HYDROcodone-acetaminophen (Norco) 5-325 mg tablet 1 tablet, oral, 2 times daily PRN    HYDROcodone-acetaminophen (Norco) 5-325 mg tablet 1 tablet, oral, 2 times daily PRN    [START ON 3/27/2024] HYDROcodone-acetaminophen (Norco) 5-325 mg tablet 1 tablet, oral, 2 times daily PRN    magnesium gluconate (Magonate) 27.5 mg magne- sium (500 mg) tablet 55 mg, oral, 2 times daily    methocarbamol (ROBAXIN) 750 mg, oral, 3 times daily PRN    metoprolol tartrate (Lopressor) 50 mg tablet TAKE 1 TABLET BY MOUTH TWO TIMES A DAY    METOPROLOL TARTRATE ORAL oral    multivitamin tablet oral    naloxone (Narcan) 4 mg/0.1 mL nasal spray nasal, Used as directed    nirmatrelvir-ritonavir (Paxlovid) 300 mg (150 mg x 2)-100 mg tablet therapy pack 2 tablets, oral, 2 times daily    pantoprazole (PROTONIX) 20 mg, oral, Daily RT    pantoprazole (PROTONIX) 40 mg, oral, Daily    pantoprazole (PROTONIX) 40 mg, oral, Daily    progesterone (Prometrium) 100 mg capsule 1 capsule, oral, Daily    risedronate (Actonel) 35 mg tablet TAKE 1 TABLET BY MOUTH ONCE WEEKLY- TAKE WITH A FULL GLASS OF WATER ON AN EMPTY STOMACH. DO NOT LIE DOWN, EAT OR DRINK FOR 30 MINUTES.    risedronate (Actonel) 35 mg tablet Take in morning with full glass of water on an empty stomach. No food, drink, meds, or lying down for 30 minutes after.    rivaroxaban (Xarelto) 20 mg tablet TAKE 1 TABLET BY MOUTH ONE TIME  DAILY    spironolactone (Aldactone) 25 mg tablet 1 tablet, oral, Daily    spironolactone (ALDACTONE) 50 mg, oral, Daily    zinc gluconate 100 mg tablet 1 tablet, oral, Daily        Labs and Imaging:     Lab Results   Component Value Date    WBC 7.1 12/30/2023    HGB 14.2 12/30/2023    HCT 44.3 12/30/2023     12/30/2023    CHOL 165 12/30/2023    TRIG 124 12/30/2023    HDL 48.4 12/30/2023    ALT 21 12/30/2023    AST 23 12/30/2023     (L) 12/30/2023    K 4.6 12/30/2023     12/30/2023    CREATININE 1.02 12/30/2023    BUN 18 12/30/2023    CO2 22 12/30/2023    TSH 2.23 01/30/2018         Echocardiogram:   Echocardiogram     [facility], [address] [place] [address]  Tel [phone] and Fax [phone]    TRANSTHORACIC ECHOCARDIOGRAM REPORT      Patient Name:     AMANUEL RAJPUT VAIBHAV Neves Physician:  [id number] Sis Hamilton MD  Study Date:       6/30/2022          Referring           ESA VELARDE  Physician:  MRN/PID:          [id number]           PCP:                Edward Rogers MD  Accession/Order#: [id number]          Department          [facility]  Location:  YOB: 1940           Fellow:  Gender:           F                  Nurse:  Admit Date:       6/29/2022          Sonographer:        Isabela Lawrence RVT,  RDMS, RDCS  Admission Status: Inpatient -        Additional Staff:  Routine  Height:           162.56 cm          CC Report to:       [facility]  Weight:           69.85 kg           Study Type:         Echocardiogram  BSA:              1.75 m2  Blood Pressure: 131 /75 mmHg    Diagnosis/ICD: R07.9-Chest pain, unspecified  Indication:    Chest Pain  Procedure/CPT: Echo Complete w Full [id number]    Patient History:  Pertinent History: HTN, Previous SVT and Chest Pain. GERD, Arrhythmia.    Study Detail: The following Echo studies were performed: 2D, M-Mode, Doppler and  color flow. The patient was awake.      PHYSICIAN  INTERPRETATION:  Left Ventricle: The left ventricular systolic function is normal, with an estimated ejection fraction of 55-60%. There are no regional wall motion abnormalities. The left ventricular cavity size is normal. Left ventricular diastolic filling was indeterminate.  Left Atrium: The left atrium is mildly dilated.  Right Ventricle: The right ventricle is normal in size. There is normal right ventricular global systolic function.  Right Atrium: The right atrium is normal in size.  Aortic Valve: The aortic valve is trileaflet. There is no evidence of aortic valve regurgitation. The peak instantaneous gradient of the aortic valve is 6.3 mmHg.  Mitral Valve: The mitral valve is normal in structure. There is no evidence of mitral valve regurgitation.  Tricuspid Valve: The tricuspid valve is structurally normal. There is mild tricuspid regurgitation.  Pulmonic Valve: The pulmonic valve is not well visualized. There is physiologic pulmonic valve regurgitation.  Pericardium: There is no pericardial effusion noted.  Aorta: The aortic root was not well visualized.      CONCLUSIONS:  1. The left ventricular systolic function is normal with a 55-60% estimated ejection fraction.    QUANTITATIVE DATA SUMMARY:  2D MEASUREMENTS:  Normal Ranges:  LAs:           3.65 cm   (2.7-4.0cm)  IVSd:          1.21 cm   (0.6-1.1cm)  LVPWd:         1.05 cm   (0.6-1.1cm)  LVIDd:         4.10 cm   (3.9-5.9cm)  LVIDs:         2.27 cm  LV Mass Index: 89.8 g/m2  LV % FS        44.7 %    LA VOLUME:  Normal Ranges:  LA Vol A4C:        48.2 ml    (22+/-6mL/m2)  LA Vol A2C:        54.6 ml  LA Vol BP:         52.9 ml  LA Vol Index A4C:  27.5ml/m2  LA Vol Index A2C:  31.2 ml/m2  LA Vol Index BP:   30.2 ml/m2  LA Area A4C:       16.5 cm2  LA Area A2C:       18.1 cm2  LA Major Axis A4C: 4.8 cm  LA Major Axis A2C: 5.1 cm  LA Volume Index:   30.3 ml/m2  LA Vol A4C:        45.1 ml  LA Vol A2C:        53.1 ml    RA VOLUME BY A/L METHOD:  Normal  Ranges:  RA Vol A4C:        31.2 ml    (8.3-19.5ml)  RA Vol Index A4C:  17.8 ml/m2  RA Area A4C:       13.0 cm2  RA Major Axis A4C: 4.6 cm    M-MODE MEASUREMENTS:  Normal Ranges:  Ao Root: 2.90 cm (2.0-3.7cm)  LAs:     3.96 cm (2.7-4.0cm)    AORTA MEASUREMENTS:  Normal Ranges:  Ao Sinus, d: 3.00 cm (2.1-3.5cm)  Asc Ao, d:   3.00 cm (2.1-3.4cm)    LV SYSTOLIC FUNCTION BY 2D PLANIMETRY (MOD):  Normal Ranges:  EF-A4C View: 49.8 % (>55%)  EF-A2C View: 51.0 %  EF-Biplane:  49.6 %    LV DIASTOLIC FUNCTION:  Normal Ranges:  MV Peak E:        1.11 m/s    (0.7-1.2 m/s)  MV Peak A:        0.60 m/s    (0.42-0.7 m/s)  E/A Ratio:        1.83        (1.0-2.2)  MV e'             0.08 m/s    (>8.0)  MV lateral e'     0.10 m/s  MV medial e'      0.07 m/s  MV A Dur:         73.82 msec  E/e' Ratio:       13.02       (<8.0)  PulmV Sys David:    89.00 cm/s  PulmV Rodríguez David:   52.84 cm/s  PulmV S/D David:    1.68  PulmV A Revs David: 30.56 cm/s  PulmV A Revs Dur: 106.11 msec    MITRAL VALVE:  Normal Ranges:  MV DT: 182 msec (150-240msec)    AORTIC VALVE:  Normal Ranges:  AoV Vmax:      1.26 m/s (<1.7m/s)  AoV Peak P.3 mmHg (<20mmHg)  LVOT Max David:  1.08 m/s (<1.1m/s)  LVOT VTI:      23.22 cm  LVOT Diameter: 1.90 cm  (1.8-2.4cm)  AoV Area,Vmax: 2.44 cm2 (2.5-4.5cm2)    RIGHT VENTRICLE:  RV 1   3.2 cm  RV 2   1.9 cm  RV 3   5.1 cm  TAPSE: 21.0 mm  RV s'  0.19 m/s    TRICUSPID VALVE/RVSP:  Normal Ranges:  Peak TR Velocity: 2.79 m/s  Est. RA Pressure: 3 mmHg.  RV Syst Pressure: 34.0 mmHg (< 30mmHg)  IVC Diam:         1.88 cm    PULMONIC VALVE:  Normal Ranges:  PV Max David: 1.0 m/s  (0.6-0.9m/s)  PV Max PG:  3.7 mmHg    Pulmonary Veins:  PulmV A Revs Dur: 106.11 msec  PulmV A Revs David: 30.56 cm/s  PulmV Rodríguez David:   52.84 cm/s  PulmV S/D David:    1.68  PulmV Sys David:    89.00 cm/s    AORTA:  Asc Ao Diam 3.00 cm      [id number] Sis Hamilton MD  Electronically signed on 2022 at 4:55:45 PM         Final     Stress Testing: No results found  for this or any previous visit from the past 1825 days.    Cardiac Catheterization: No results found for this or any previous visit from the past 1825 days.    Cardiac Scoring: No results found for this or any previous visit from the past 1825 days.    AAA : No results found for this or any previous visit from the past 1825 days.    OTHER: No results found for this or any previous visit from the past 1825 days.      The ASCVD Risk score (Lidia DK, et al., 2019) failed to calculate for the following reasons:    The 2019 ASCVD risk score is only valid for ages 40 to 79     Assessment/Plan:   1. Preop cardiovascular exam        2. Benign hypertension        3. Paroxysmal atrial fibrillation (CMS/HCC)           Chel Lowe 84 y.o. female with hx of PAF. SVT. HTN. Preserved LV systolic function. Normal stress nuc test 2019 follows with Dr. Hamilton, new to me here today for preop risk assessment prior to rt knee surgery    Asymptomatic from cardiac stand point and able to achieve >4 METS     NSR on physical exam   EKG 2 days ago at [facility] with NSR and non specific T changes (unchanged from prior ekgs)    She is low risk, RCRI zero     Plan  Continue xarelto, can be interrupted 72 hrs prior to surgery and restart once safe from surgical stand point   Continue metoprolol   Avoid dehydration   Follow up with Dr. Hamilton as prior arranged       Time Spent: I spent 40 minutes reviewing medical testing, obtaining medical history and counselling and educating on diagnosis and documenting clinical encounter.         ____________________________________________________________  Hu Olivarez MD   of Medicine  Division of Cardiovascular Medicine   [facility] [place]  [facility]

## 2024-04-01 ENCOUNTER — APPOINTMENT (OUTPATIENT)
Dept: ENDOCRINOLOGY | Facility: CLINIC | Age: 84
End: 2024-04-01
Payer: MEDICARE

## 2024-04-03 ENCOUNTER — TELEPHONE (OUTPATIENT)
Dept: PAIN MEDICINE | Facility: CLINIC | Age: 84
End: 2024-04-03
Payer: MEDICARE

## 2024-04-03 DIAGNOSIS — M54.32 SCIATICA OF LEFT SIDE: ICD-10-CM

## 2024-04-03 RX ORDER — PREDNISONE 20 MG/1
20 TABLET ORAL 2 TIMES DAILY
Qty: 10 TABLET | Refills: 0 | Status: SHIPPED | OUTPATIENT
Start: 2024-04-03 | End: 2024-04-08

## 2024-04-03 NOTE — TELEPHONE ENCOUNTER
Spoke with patient on phone. Patient states she is having flare of left sciatic pain and muscle spasms. States she has been taking Robaxin with no relief. Requesting steroid.

## 2024-04-03 NOTE — TELEPHONE ENCOUNTER
Please send RX of prednisone to [facility] pharmacy. Please call patient

## 2024-04-15 ENCOUNTER — HOSPITAL ENCOUNTER (OUTPATIENT)
Dept: RADIOLOGY | Facility: HOSPITAL | Age: 84
Discharge: HOME | End: 2024-04-15
Payer: MEDICARE

## 2024-04-15 DIAGNOSIS — M54.50 LOW BACK PAIN: ICD-10-CM

## 2024-04-15 PROCEDURE — 72110 X-RAY EXAM L-2 SPINE 4/>VWS: CPT | Performed by: RADIOLOGY

## 2024-04-15 PROCEDURE — 72110 X-RAY EXAM L-2 SPINE 4/>VWS: CPT

## 2024-04-22 NOTE — PROGRESS NOTES
Patient ID: Chel Lowe is a 84 y.o. female.  Referring Physician: No referring provider defined for this encounter.  Primary Care Provider: Edward Rogers MD    Subjective    Interval History:  Doing great. No sxs or issues. No VB/pain. Had a fall and tore her meniscus. Had surgery at the end of march. Eating and drinking normally.    They deny fever, chills, chest pain, SOB, nausea, vomiting, diarrhea, constipation, dysuria, or any other concerning signs of symptoms          Past medical history:  Hypertension  Osteoporosis  Osteoarthritis  Mild renal insufficiency (GFR 57 in 2020)     Surgical history:   ×1  Removal of parathyroid gland , benign  Abdominoplasty   Shoulder and elbow surgeries.     OB/GYN history:  Hormone replacement therapy beginning in  and continuing to the present time.  Presently on estradiol 1 mg daily, takes 200 mg of progesterone days 1-12 each month.     No recalled history of abnormal Pap smears.     Family history:  No family history of cancer.  She is an only child.    Objective    BSA: 1.79 meters squared  /75 (BP Location: Left arm, Patient Position: Sitting, BP Cuff Size: Adult)   Pulse 76   Temp 36.2 °C (97.2 °F) (Temporal)   Resp 16   Wt 70.7 kg (155 lb 12.1 oz)   SpO2 96%   BMI 26.74 kg/m²      Physical Exam  Constitutional:       General: She is not in acute distress.     Appearance: Normal appearance. She is normal weight. She is not toxic-appearing.   HENT:      Head: Normocephalic.      Mouth/Throat:      Mouth: Mucous membranes are moist.      Pharynx: Oropharynx is clear.   Eyes:      Extraocular Movements: Extraocular movements intact.      Conjunctiva/sclera: Conjunctivae normal.      Pupils: Pupils are equal, round, and reactive to light.   Cardiovascular:      Rate and Rhythm: Normal rate and regular rhythm.      Heart sounds: Normal heart sounds. No murmur heard.     No friction rub. No gallop.   Pulmonary:      Effort:  Pulmonary effort is normal.      Breath sounds: Normal breath sounds. No wheezing or rhonchi.   Abdominal:      General: Abdomen is flat. Bowel sounds are normal. There is no distension.      Palpations: Abdomen is soft.      Tenderness: There is no abdominal tenderness.   Genitourinary:     Comments: Normal external female genitalia without lesions or masses  Speculum exam: Smooth vagina without lesions or masses, radiation changes noted in the vagina.  Bimanual exam: smooth vagina without lesions or masses, surgically absent uterus, cervix, adnexa  Rectovaginal exam: Smooth rectovaginal septum without lesions or masses    Musculoskeletal:         General: No swelling. Normal range of motion.      Cervical back: Normal range of motion.   Skin:     General: Skin is warm and dry.   Neurological:      General: No focal deficit present.      Mental Status: She is alert and oriented to person, place, and time.   Psychiatric:         Mood and Affect: Mood normal.         Behavior: Behavior normal.         Performance Status:  Asymptomatic    Assessment/Plan     Oncology History Overview Note   Tumor History:   12/20: [facility] BSO, sentinel lymph nodes, Stage IA grade 3, serous, endometrial cancer. 9% invasion, Lymphatic vessel invasion present, MMR intact, Her2 expression negative  1/2021-2/2021: Chemo started, HSR to taxolx2 with change to abraxane     Endometrial carcinoma (Multi)   11/3/2023 Initial Diagnosis    Endometrial carcinoma (CMS/HCC)       84 y.o.  female with history of Stage IA grade 3, serous endometrial cancer s/p TLH/BSO, SLN excision on 12/14/20 followed by adjuvant therapy with  2 cycles carboplatin and taxol and 1 cycle carboplatin and abraxane completed 2/23/21. She completed vaginal brachytherapy at [facility] on 4/8/21.      # Endometrial cancer  - Physical examination was within normal limits today, no evidence of recurrence   - We reviewed signs and symptoms of possible recurrence with the patient and she  will call our office should she experience any of these  - Follow up in 6 months       Marjan Mercedes MD

## 2024-04-23 ENCOUNTER — OFFICE VISIT (OUTPATIENT)
Dept: GYNECOLOGIC ONCOLOGY | Facility: CLINIC | Age: 84
End: 2024-04-23
Payer: MEDICARE

## 2024-04-23 ENCOUNTER — OFFICE VISIT (OUTPATIENT)
Dept: PAIN MEDICINE | Facility: CLINIC | Age: 84
End: 2024-04-23
Payer: MEDICARE

## 2024-04-23 ENCOUNTER — APPOINTMENT (OUTPATIENT)
Dept: PAIN MEDICINE | Facility: CLINIC | Age: 84
End: 2024-04-23
Payer: MEDICARE

## 2024-04-23 VITALS — HEART RATE: 101 BPM | RESPIRATION RATE: 20 BRPM | DIASTOLIC BLOOD PRESSURE: 82 MMHG | SYSTOLIC BLOOD PRESSURE: 143 MMHG

## 2024-04-23 VITALS
WEIGHT: 155.75 LBS | TEMPERATURE: 97.2 F | OXYGEN SATURATION: 96 % | RESPIRATION RATE: 16 BRPM | BODY MASS INDEX: 26.74 KG/M2 | HEART RATE: 76 BPM | SYSTOLIC BLOOD PRESSURE: 121 MMHG | DIASTOLIC BLOOD PRESSURE: 75 MMHG

## 2024-04-23 DIAGNOSIS — M48.062 NEUROGENIC CLAUDICATION DUE TO LUMBAR SPINAL STENOSIS: ICD-10-CM

## 2024-04-23 DIAGNOSIS — M51.36 DEGENERATION OF L4-L5 INTERVERTEBRAL DISC: ICD-10-CM

## 2024-04-23 DIAGNOSIS — Z85.42 ENCOUNTER FOR FOLLOW-UP SURVEILLANCE OF UTERINE CANCER: Primary | ICD-10-CM

## 2024-04-23 DIAGNOSIS — M81.0 AGE-RELATED OSTEOPOROSIS WITHOUT CURRENT PATHOLOGICAL FRACTURE: ICD-10-CM

## 2024-04-23 DIAGNOSIS — M51.26 DISPLACEMENT OF LUMBAR INTERVERTEBRAL DISC WITHOUT MYELOPATHY: ICD-10-CM

## 2024-04-23 DIAGNOSIS — Z79.891 ENCOUNTER FOR LONG-TERM OPIATE ANALGESIC USE: ICD-10-CM

## 2024-04-23 DIAGNOSIS — M47.816 LUMBAR SPONDYLOSIS: ICD-10-CM

## 2024-04-23 DIAGNOSIS — Z08 ENCOUNTER FOR FOLLOW-UP SURVEILLANCE OF UTERINE CANCER: Primary | ICD-10-CM

## 2024-04-23 DIAGNOSIS — C54.1 ENDOMETRIAL CARCINOMA (MULTI): ICD-10-CM

## 2024-04-23 PROCEDURE — 3077F SYST BP >= 140 MM HG: CPT | Performed by: ANESTHESIOLOGY

## 2024-04-23 PROCEDURE — 99214 OFFICE O/P EST MOD 30 MIN: CPT | Performed by: STUDENT IN AN ORGANIZED HEALTH CARE EDUCATION/TRAINING PROGRAM

## 2024-04-23 PROCEDURE — 3074F SYST BP LT 130 MM HG: CPT | Performed by: STUDENT IN AN ORGANIZED HEALTH CARE EDUCATION/TRAINING PROGRAM

## 2024-04-23 PROCEDURE — 1125F AMNT PAIN NOTED PAIN PRSNT: CPT | Performed by: ANESTHESIOLOGY

## 2024-04-23 PROCEDURE — 99213 OFFICE O/P EST LOW 20 MIN: CPT | Performed by: ANESTHESIOLOGY

## 2024-04-23 PROCEDURE — 1159F MED LIST DOCD IN RCRD: CPT | Performed by: ANESTHESIOLOGY

## 2024-04-23 PROCEDURE — 3078F DIAST BP <80 MM HG: CPT | Performed by: STUDENT IN AN ORGANIZED HEALTH CARE EDUCATION/TRAINING PROGRAM

## 2024-04-23 PROCEDURE — 3079F DIAST BP 80-89 MM HG: CPT | Performed by: ANESTHESIOLOGY

## 2024-04-23 PROCEDURE — 1159F MED LIST DOCD IN RCRD: CPT | Performed by: STUDENT IN AN ORGANIZED HEALTH CARE EDUCATION/TRAINING PROGRAM

## 2024-04-23 PROCEDURE — 1126F AMNT PAIN NOTED NONE PRSNT: CPT | Performed by: STUDENT IN AN ORGANIZED HEALTH CARE EDUCATION/TRAINING PROGRAM

## 2024-04-23 PROCEDURE — 1036F TOBACCO NON-USER: CPT | Performed by: STUDENT IN AN ORGANIZED HEALTH CARE EDUCATION/TRAINING PROGRAM

## 2024-04-23 RX ORDER — HYDROCODONE BITARTRATE AND ACETAMINOPHEN 5; 325 MG/1; MG/1
1 TABLET ORAL 2 TIMES DAILY PRN
Qty: 56 TABLET | Refills: 0 | Status: SHIPPED | OUTPATIENT
Start: 2024-05-22 | End: 2024-06-19

## 2024-04-23 RX ORDER — HYDROCODONE BITARTRATE AND ACETAMINOPHEN 5; 325 MG/1; MG/1
1 TABLET ORAL 2 TIMES DAILY PRN
Qty: 56 TABLET | Refills: 0 | Status: SHIPPED | OUTPATIENT
Start: 2024-04-24 | End: 2024-05-22

## 2024-04-23 RX ORDER — HYDROCODONE BITARTRATE AND ACETAMINOPHEN 5; 325 MG/1; MG/1
1 TABLET ORAL 2 TIMES DAILY PRN
Qty: 56 TABLET | Refills: 0 | Status: SHIPPED | OUTPATIENT
Start: 2024-06-19

## 2024-04-23 ASSESSMENT — PAIN SCALES - GENERAL
PAINLEVEL_OUTOF10: 2
PAINLEVEL: 0-NO PAIN

## 2024-04-23 ASSESSMENT — PAIN DESCRIPTION - DESCRIPTORS: DESCRIPTORS: ACHING

## 2024-04-23 NOTE — PROGRESS NOTES
Pain Management Clinic Note     Chief Complaint: Medication refill      History Of Present Illness  Chel Lowe is a 84 y.o. female with a past medical history of **who presents to clinic for medication refill.  Patient was last seen on 2023 with primary diagnosis of degeneration of L4-L5 intervertebral disc.  She has been prescribed hydrocodone-acetaminophen 5-325 mg twice daily and Robaxin-750 mg twice daily for her chronic low back pain.  Patient states that she has a 90-95% reduction in her pain daily with consistent use of the medication.  Today in clinic she reports no pain in her back.  She states that she is consistent with taking her medications in the morning and evenings, and notes that her days are usually much better with the medication. She does not feel significant drowsiness or sleepiness with the medications at this time. She recently had a right knee arthroscopy done for this close injury.  She states that she did not have to take increased pain medications after the procedure and has been tolerating physical therapy well on her current regimen.  She is able to tolerate doing her ADLs successfully with the pain medication.  Her last contact date review was 2023, and her toxicology screen is up-to-date and consistent.  I have reviewed her OARRS her MME is 10.  She is overall very happy with her treatment willing to continue the medication at this time.       Past Medical History  She has a past medical history of Endometrial cancer (Multi).    Surgical History  She has a past surgical history that includes  section, classic (2013); Shoulder surgery (2013); and Hysterectomy.     Social History  She reports that she has never smoked. She has never used smokeless tobacco. She reports that she does not drink alcohol and does not use drugs.    Family History  No family history on file.     Allergies  Patient has no known allergies.    Review of Symptoms:    Constitutional: Negative for chills, diaphoresis or fever  HENT: Negative for neck swelling  Eyes:.  Negative for eye pain  Respiratory:.  Negative for cough, shortness of breath or wheezing    Cardiovascular:.  Negative for chest pain or palpitations  Gastrointestinal:.  Negative for abdominal pain, nausea and vomiting  Genitourinary:.  Negative for urgency  Musculoskeletal:  Negative for back pain. Positive for joint pain. Denies falls within the past 3 months.  Skin: Negative for wounds or itching   Neurological: Negative for dizziness, seizures, loss of consciousness and weakness  Endo/Heme/Allergies: Does not bruise/bleed easily  Psychiatric/Behavioral: Negative for depression. The patient does not appear anxious.       PHYSICAL EXAM  Vitals signs reviewed  Constitutional:       General: Not in acute distress     Appearance: Normal appearance. Not ill-appearing.  HENT:     Head: Normocephalic and atraumatic  Eyes:     Conjunctiva/sclera: Conjunctivae normal  Cardiovascular:     Rate and Rhythm: Normal rate and regular rhythm  Pulmonary:     Effort: No respiratory distress  Abdominal:     Palpations: Abdomen is soft  Musculoskeletal: OLIVEIRA  Skin:     General: Skin is warm and dry  Neurological:     General: No focal deficit present  Psychiatric:         Mood and Affect: Mood normal         Behavior: Behavior normal    Advanced Exam   Inspection: Mild swelling of right knee, arthroscopic incisions over R knee well healed.  Palpation: Mild tenderness to palpation of right knee joint. No tenderness of patient of lumbar midline, lumbar paraspinals, bilateral SI joints  ROM: Normal range of motion of the lumbar flexion extension  Motor: 5/5 strength upper and lower extremities  Sensory: Negative for sensory abnormalities in upper and lower extremities  Reflexes: 2+ reflexes bilateral upper and lower extremities  Lumbar: Negative straight leg raising bilaterally, negative for facet loading       Last Recorded  Vitals  /82   Pulse 101   Resp 20     Relevant Results  Current Outpatient Medications   Medication Instructions    acetaminophen (TYLENOL) 650 mg, oral, Every 4 hours PRN    ascorbic acid (Vitamin C) 500 mg tablet oral    aspirin 81 mg EC tablet oral, Daily RT    calcium carbonate-vitamin D3 600 mg-5 mcg (200 unit) tablet 1 tablet, oral, 2 times daily    clotrimazole-betamethasone (Lotrisone) cream 1 Application, Topical, 2 times daily    estradiol (Estrace) 1 mg tablet 1 tablet, oral, Daily    gabapentin (Neurontin) 300 mg capsule oral    glucosamine-chondroit-vit C-Mn 500-400 mg capsule 1 capsule, oral, 2 times daily    HYDROcodone-acetaminophen (Norco) 5-325 mg tablet 1 tablet, oral, 2 times daily PRN    HYDROcodone-acetaminophen (Norco) 5-325 mg tablet 1 tablet, oral, 2 times daily PRN    HYDROcodone-acetaminophen (Norco) 5-325 mg tablet 1 tablet, oral, 2 times daily PRN    HYDROcodone-acetaminophen (Norco) 5-325 mg tablet 1 tablet, oral, 2 times daily PRN    magnesium gluconate (Magonate) 27.5 mg magne- sium (500 mg) tablet 55 mg, oral, 2 times daily    methocarbamol (ROBAXIN) 750 mg, oral, 3 times daily PRN    metoprolol tartrate (Lopressor) 50 mg tablet TAKE 1 TABLET BY MOUTH TWO TIMES A DAY    METOPROLOL TARTRATE ORAL oral    multivitamin tablet oral    naloxone (Narcan) 4 mg/0.1 mL nasal spray nasal, Used as directed    nirmatrelvir-ritonavir (Paxlovid) 300 mg (150 mg x 2)-100 mg tablet therapy pack 2 tablets, oral, 2 times daily    pantoprazole (PROTONIX) 20 mg, oral, Daily RT    pantoprazole (PROTONIX) 40 mg, oral, Daily    pantoprazole (PROTONIX) 40 mg, oral, Daily    progesterone (Prometrium) 100 mg capsule 1 capsule, oral, Daily    risedronate (Actonel) 35 mg tablet TAKE 1 TABLET BY MOUTH ONCE WEEKLY- TAKE WITH A FULL GLASS OF WATER ON AN EMPTY STOMACH. DO NOT LIE DOWN, EAT OR DRINK FOR 30 MINUTES.    risedronate (Actonel) 35 mg tablet Take in morning with full glass of water on an empty  stomach. No food, drink, meds, or lying down for 30 minutes after.    rivaroxaban (Xarelto) 20 mg tablet TAKE 1 TABLET BY MOUTH ONE TIME DAILY    spironolactone (Aldactone) 25 mg tablet 1 tablet, oral, Daily    spironolactone (ALDACTONE) 50 mg, oral, Daily    zinc gluconate 100 mg tablet 1 tablet, oral, Daily       No results found for this or any previous visit from the past 1000 days.     No image results found.       1. Age-related osteoporosis without current pathological fracture        2. Neurogenic claudication due to lumbar spinal stenosis        3. Degeneration of L4-L5 intervertebral disc        4. Encounter for long-term opiate analgesic use        5. Displacement of lumbar intervertebral disc without myelopathy        6. Lumbar spondylosis             ASSESSMENT/PLAN  Chel Lowe is a 84 y.o. female with a past medical history of lumbar stenosis with neurogenic claudication.  Patient is scheduled to have an MRI lumbosacral spine this week.  Based on the results of the MRI we will consider further treatment of her lumbar stenosis.  Given significant improvement in her pain with the medication we will refill her medications today. Patient is up to date with her medication contract and her toxicology testing.        Our plan is as follows:  - Will refill hydrocodone-acetaminophen 5-325 mg BID and methocarbamol 750 mg BID  - Continue to participate in physical therapy as well as physician directed home exercises  - Continue pain medications as prescribed  -Reviewed results of MRI of lumbosacral spine       Alis Mccarty MD

## 2024-04-29 ENCOUNTER — TELEMEDICINE (OUTPATIENT)
Dept: PAIN MEDICINE | Facility: CLINIC | Age: 84
End: 2024-04-29
Payer: MEDICARE

## 2024-04-29 DIAGNOSIS — M48.062 SPINAL STENOSIS OF LUMBAR REGION WITH NEUROGENIC CLAUDICATION: Primary | ICD-10-CM

## 2024-04-29 DIAGNOSIS — M54.16 LUMBAR RADICULITIS: ICD-10-CM

## 2024-04-29 PROCEDURE — 1159F MED LIST DOCD IN RCRD: CPT | Performed by: ANESTHESIOLOGY

## 2024-04-29 PROCEDURE — 99442 PR PHYS/QHP TELEPHONE EVALUATION 11-20 MIN: CPT | Performed by: ANESTHESIOLOGY

## 2024-04-29 RX ORDER — HYDROCODONE BITARTRATE AND ACETAMINOPHEN 5; 325 MG/1; MG/1
1 TABLET ORAL 3 TIMES DAILY PRN
Qty: 84 TABLET | Refills: 0 | Status: SHIPPED | OUTPATIENT
Start: 2024-05-13

## 2024-04-29 RX ORDER — HYDROCODONE BITARTRATE AND ACETAMINOPHEN 5; 325 MG/1; MG/1
1 TABLET ORAL 3 TIMES DAILY PRN
Qty: 84 TABLET | Refills: 0 | Status: SHIPPED | OUTPATIENT
Start: 2024-06-10

## 2024-04-29 NOTE — PROGRESS NOTES
This is a virtual visit conducted through telephone call.  Duration of visit is 11 minutes.  History Of Present Illness  Chel Lowe is a 84 y.o. female presenting with low back pain radiating to the posterior aspect of the left lower extremity.  Patient describes it as sharp shooting in nature not associated with any numbness or weakness.  Patient reports that her pain has significantly decreased over the last week.  Patient is able to stand more than 20 minutes and walk for 1 block without any difficulty.  MRI lumbosacral spine done in 2024 showed L1-L3 severe central canal stenosis with ligamentum flavum hypertrophy.  And an L5-S1 right disc bulge contacting the traversing S1 nerve roots.  Current pain medications include hydrocodone 5-3 25 1 tablet twice daily.  Patient is requesting if she can increase it to 3 times a day.     Past Medical History  She has a past medical history of Endometrial cancer (Multi).    Surgical History  She has a past surgical history that includes  section, classic (2013); Shoulder surgery (2013); and Hysterectomy.     Social History  She reports that she has never smoked. She has never used smokeless tobacco. She reports that she does not drink alcohol and does not use drugs.    Family History  No family history on file.     Allergies  Patient has no known allergies.    Review of systems:   13-point review of systems done and negative except as noted in HPI      Physical Exam   Vital signs: Reviewed  Constitutional: No acute distress, well appearing and well nourished. Patient appears stated age.   Eyes: Conjunctiva non-icteric and eye lids are without obvious rash or drooping. Pupils are symmetric.   Ears, Nose, Mouth, and Throat: External ears and nose appear to be without deformity or rash. No lesions or masses noted. Hearing is grossly intact.   Neck:. No JVD noted, tracheal position is midline.   Head and Face: Examination of the head and face  revealed no abnormalities.   Respiratory: No gasping or shortness of breath noted, no use of accessory muscles noted.   Cardiovascular: Examination for edema is normal.   GI: Abdomen nontender to palpation.   Skin: No rashes or open lesions/ulcers identified on skin.   Musk: Digits/nails show no clubbing or cyanosis. No asymmetry or masses noted of the musculature. Examination of the muscles/joints/bones show normal range of motion. Gait is grossly [].  [] to walk on toes and heels.   Neurologic: Cranial nerves II-XII intact, motor strength 5/5 muscle strength of the lower extremities bilaterally and equal. 5/5 muscle strength of the upper extremities bilaterally and equal.   Reflexes: normal.   Sensation: []  Provocative tests:       Last Recorded Vitals  There were no vitals taken for this visit.    Relevant Results            Last OARRS Review: No data recorded    I have personally reviewed the OARRS report for Chel Lowe I have considered the risks of abuse, dependence, addiction and diversion  Overdose risk score is 40     Assessment/Plan   Diagnoses and all orders for this visit:  Spinal stenosis of lumbar region with neurogenic claudication  Lumbar radiculitis      Plan  Hydrocodone 5/325 1 tablet 3 times daily  Patient may be a candidate for a mild procedure at L2-L3       Chang Murray MD

## 2024-05-06 ENCOUNTER — APPOINTMENT (OUTPATIENT)
Dept: CARDIOLOGY | Facility: CLINIC | Age: 84
End: 2024-05-06
Payer: MEDICARE

## 2024-05-07 ENCOUNTER — TELEPHONE (OUTPATIENT)
Dept: PAIN MEDICINE | Facility: CLINIC | Age: 84
End: 2024-05-07
Payer: MEDICARE

## 2024-05-07 DIAGNOSIS — M54.16 LUMBAR RADICULITIS: ICD-10-CM

## 2024-05-07 NOTE — TELEPHONE ENCOUNTER
Patient having a lot of pain in her knee, can't get in to see Dr. Espinosa until next Thursday. They suggested calling us to see if Dr. Murray can send in prednisone to [facility] to help her until she gets in next week.

## 2024-05-07 NOTE — PROGRESS NOTES
Her ortho surgeon recommending prednisone burst for her knee pain;she is 8 weeks s/p arthroscopy. Pt reports they asked if she call us to prescribe. Can you send to [facility]

## 2024-05-08 PROCEDURE — RXMED WILLOW AMBULATORY MEDICATION CHARGE

## 2024-05-08 RX ORDER — PREDNISONE 20 MG/1
TABLET ORAL
Qty: 10 TABLET | Refills: 0 | Status: SHIPPED | OUTPATIENT
Start: 2024-05-08

## 2024-05-09 ENCOUNTER — PHARMACY VISIT (OUTPATIENT)
Dept: PHARMACY | Facility: CLINIC | Age: 84
End: 2024-05-09
Payer: COMMERCIAL

## 2024-05-16 ENCOUNTER — PHARMACY VISIT (OUTPATIENT)
Dept: PHARMACY | Facility: CLINIC | Age: 84
End: 2024-05-16

## 2024-05-16 PROCEDURE — RXMED WILLOW AMBULATORY MEDICATION CHARGE

## 2024-05-20 ENCOUNTER — LAB (OUTPATIENT)
Dept: LAB | Facility: LAB | Age: 84
End: 2024-05-20
Payer: MEDICARE

## 2024-05-20 DIAGNOSIS — N39.0 URINARY TRACT INFECTION, SITE NOT SPECIFIED: Primary | ICD-10-CM

## 2024-05-20 LAB
APPEARANCE UR: ABNORMAL
BILIRUB UR STRIP.AUTO-MCNC: NEGATIVE MG/DL
COLOR UR: YELLOW
GLUCOSE UR STRIP.AUTO-MCNC: NEGATIVE MG/DL
HOLD SPECIMEN: NORMAL
KETONES UR STRIP.AUTO-MCNC: NEGATIVE MG/DL
LEUKOCYTE ESTERASE UR QL STRIP.AUTO: NEGATIVE
NITRITE UR QL STRIP.AUTO: NEGATIVE
PH UR STRIP.AUTO: 6 [PH]
PROT UR STRIP.AUTO-MCNC: NEGATIVE MG/DL
RBC # UR STRIP.AUTO: NEGATIVE /UL
SP GR UR STRIP.AUTO: 1.01
UROBILINOGEN UR STRIP.AUTO-MCNC: <2 MG/DL

## 2024-05-20 PROCEDURE — 81003 URINALYSIS AUTO W/O SCOPE: CPT

## 2024-05-29 PROCEDURE — RXMED WILLOW AMBULATORY MEDICATION CHARGE

## 2024-05-30 ENCOUNTER — PHARMACY VISIT (OUTPATIENT)
Dept: PHARMACY | Facility: CLINIC | Age: 84
End: 2024-05-30
Payer: COMMERCIAL

## 2024-07-01 ENCOUNTER — LAB (OUTPATIENT)
Dept: LAB | Facility: LAB | Age: 84
End: 2024-07-01
Payer: MEDICARE

## 2024-07-01 ENCOUNTER — TELEPHONE (OUTPATIENT)
Dept: CARDIOLOGY | Facility: CLINIC | Age: 84
End: 2024-07-01

## 2024-07-01 ENCOUNTER — HOSPITAL ENCOUNTER (OUTPATIENT)
Dept: CARDIOLOGY | Facility: HOSPITAL | Age: 84
Discharge: HOME | End: 2024-07-01
Payer: MEDICARE

## 2024-07-01 DIAGNOSIS — R06.00 DYSPNEA, UNSPECIFIED TYPE: ICD-10-CM

## 2024-07-01 DIAGNOSIS — I48.0 PAROXYSMAL ATRIAL FIBRILLATION (MULTI): ICD-10-CM

## 2024-07-01 DIAGNOSIS — R94.31 ABNORMAL EKG: ICD-10-CM

## 2024-07-01 LAB
ALBUMIN SERPL BCP-MCNC: 4.2 G/DL (ref 3.4–5)
ALP SERPL-CCNC: 37 U/L (ref 33–136)
ALT SERPL W P-5'-P-CCNC: 22 U/L (ref 7–45)
ANION GAP SERPL CALC-SCNC: 9 MMOL/L (ref 10–20)
AST SERPL W P-5'-P-CCNC: 22 U/L (ref 9–39)
BASOPHILS # BLD AUTO: 0.03 X10*3/UL (ref 0–0.1)
BASOPHILS NFR BLD AUTO: 0.4 %
BILIRUB SERPL-MCNC: 1.2 MG/DL (ref 0–1.2)
BNP SERPL-MCNC: 308 PG/ML (ref 0–99)
BUN SERPL-MCNC: 16 MG/DL (ref 6–23)
CALCIUM SERPL-MCNC: 9.5 MG/DL (ref 8.6–10.3)
CHLORIDE SERPL-SCNC: 102 MMOL/L (ref 98–107)
CO2 SERPL-SCNC: 30 MMOL/L (ref 21–32)
CREAT SERPL-MCNC: 0.99 MG/DL (ref 0.5–1.05)
EGFRCR SERPLBLD CKD-EPI 2021: 56 ML/MIN/1.73M*2
EOSINOPHIL # BLD AUTO: 0.11 X10*3/UL (ref 0–0.4)
EOSINOPHIL NFR BLD AUTO: 1.4 %
ERYTHROCYTE [DISTWIDTH] IN BLOOD BY AUTOMATED COUNT: 13.6 % (ref 11.5–14.5)
GLUCOSE SERPL-MCNC: 109 MG/DL (ref 74–99)
HCT VFR BLD AUTO: 47.5 % (ref 36–46)
HGB BLD-MCNC: 15.3 G/DL (ref 12–16)
IMM GRANULOCYTES # BLD AUTO: 0.05 X10*3/UL (ref 0–0.5)
IMM GRANULOCYTES NFR BLD AUTO: 0.6 % (ref 0–0.9)
LYMPHOCYTES # BLD AUTO: 2.02 X10*3/UL (ref 0.8–3)
LYMPHOCYTES NFR BLD AUTO: 25.6 %
MCH RBC QN AUTO: 31.8 PG (ref 26–34)
MCHC RBC AUTO-ENTMCNC: 32.2 G/DL (ref 32–36)
MCV RBC AUTO: 99 FL (ref 80–100)
MONOCYTES # BLD AUTO: 0.71 X10*3/UL (ref 0.05–0.8)
MONOCYTES NFR BLD AUTO: 9 %
NEUTROPHILS # BLD AUTO: 4.97 X10*3/UL (ref 1.6–5.5)
NEUTROPHILS NFR BLD AUTO: 63 %
NRBC BLD-RTO: 0 /100 WBCS (ref 0–0)
PLATELET # BLD AUTO: 221 X10*3/UL (ref 150–450)
POTASSIUM SERPL-SCNC: 4.2 MMOL/L (ref 3.5–5.3)
PROT SERPL-MCNC: 6.4 G/DL (ref 6.4–8.2)
RBC # BLD AUTO: 4.81 X10*6/UL (ref 4–5.2)
SODIUM SERPL-SCNC: 137 MMOL/L (ref 136–145)
WBC # BLD AUTO: 7.9 X10*3/UL (ref 4.4–11.3)

## 2024-07-01 PROCEDURE — 93010 ELECTROCARDIOGRAM REPORT: CPT | Performed by: INTERNAL MEDICINE

## 2024-07-01 PROCEDURE — 80053 COMPREHEN METABOLIC PANEL: CPT

## 2024-07-01 PROCEDURE — 85025 COMPLETE CBC W/AUTO DIFF WBC: CPT

## 2024-07-01 PROCEDURE — 83880 ASSAY OF NATRIURETIC PEPTIDE: CPT

## 2024-07-01 PROCEDURE — 36415 COLL VENOUS BLD VENIPUNCTURE: CPT

## 2024-07-01 PROCEDURE — 93005 ELECTROCARDIOGRAM TRACING: CPT

## 2024-07-02 DIAGNOSIS — R60.9 SWELLING: Primary | ICD-10-CM

## 2024-07-02 LAB
ATRIAL RATE: 72 BPM
P AXIS: 75 DEGREES
P OFFSET: 197 MS
P ONSET: 152 MS
PR INTERVAL: 130 MS
Q ONSET: 217 MS
QRS COUNT: 11 BEATS
QRS DURATION: 64 MS
QT INTERVAL: 376 MS
QTC CALCULATION(BAZETT): 411 MS
QTC FREDERICIA: 399 MS
R AXIS: -4 DEGREES
T AXIS: 27 DEGREES
T OFFSET: 405 MS
VENTRICULAR RATE: 72 BPM

## 2024-07-02 RX ORDER — FUROSEMIDE 20 MG/1
20 TABLET ORAL DAILY
Qty: 30 TABLET | Refills: 0 | Status: SHIPPED | OUTPATIENT
Start: 2024-07-02 | End: 2024-08-01

## 2024-07-08 ENCOUNTER — LAB REQUISITION (OUTPATIENT)
Dept: LAB | Facility: HOSPITAL | Age: 84
End: 2024-07-08
Payer: MEDICARE

## 2024-07-08 DIAGNOSIS — I10 ESSENTIAL (PRIMARY) HYPERTENSION: ICD-10-CM

## 2024-07-08 DIAGNOSIS — I50.9 HEART FAILURE, UNSPECIFIED (MULTI): ICD-10-CM

## 2024-07-08 LAB
ANION GAP SERPL CALC-SCNC: 11 MMOL/L (ref 10–20)
BASOPHILS # BLD AUTO: 0.03 X10*3/UL (ref 0–0.1)
BASOPHILS NFR BLD AUTO: 0.3 %
BNP SERPL-MCNC: 280 PG/ML (ref 0–99)
BUN SERPL-MCNC: 10 MG/DL (ref 6–23)
CALCIUM SERPL-MCNC: 10.2 MG/DL (ref 8.6–10.3)
CHLORIDE SERPL-SCNC: 103 MMOL/L (ref 98–107)
CO2 SERPL-SCNC: 26 MMOL/L (ref 21–32)
CREAT SERPL-MCNC: 0.92 MG/DL (ref 0.5–1.05)
EGFRCR SERPLBLD CKD-EPI 2021: 62 ML/MIN/1.73M*2
EOSINOPHIL # BLD AUTO: 0.15 X10*3/UL (ref 0–0.4)
EOSINOPHIL NFR BLD AUTO: 1.6 %
ERYTHROCYTE [DISTWIDTH] IN BLOOD BY AUTOMATED COUNT: 13.8 % (ref 11.5–14.5)
GLUCOSE SERPL-MCNC: 113 MG/DL (ref 74–99)
HCT VFR BLD AUTO: 46.4 % (ref 36–46)
HGB BLD-MCNC: 14.8 G/DL (ref 12–16)
IMM GRANULOCYTES # BLD AUTO: 0.04 X10*3/UL (ref 0–0.5)
IMM GRANULOCYTES NFR BLD AUTO: 0.4 % (ref 0–0.9)
LYMPHOCYTES # BLD AUTO: 1.77 X10*3/UL (ref 0.8–3)
LYMPHOCYTES NFR BLD AUTO: 19 %
MCH RBC QN AUTO: 31.8 PG (ref 26–34)
MCHC RBC AUTO-ENTMCNC: 31.9 G/DL (ref 32–36)
MCV RBC AUTO: 100 FL (ref 80–100)
MONOCYTES # BLD AUTO: 0.69 X10*3/UL (ref 0.05–0.8)
MONOCYTES NFR BLD AUTO: 7.4 %
NEUTROPHILS # BLD AUTO: 6.64 X10*3/UL (ref 1.6–5.5)
NEUTROPHILS NFR BLD AUTO: 71.3 %
NRBC BLD-RTO: 0 /100 WBCS (ref 0–0)
PLATELET # BLD AUTO: 216 X10*3/UL (ref 150–450)
POTASSIUM SERPL-SCNC: 4.1 MMOL/L (ref 3.5–5.3)
RBC # BLD AUTO: 4.66 X10*6/UL (ref 4–5.2)
SODIUM SERPL-SCNC: 136 MMOL/L (ref 136–145)
WBC # BLD AUTO: 9.3 X10*3/UL (ref 4.4–11.3)

## 2024-07-08 PROCEDURE — 80048 BASIC METABOLIC PNL TOTAL CA: CPT

## 2024-07-08 PROCEDURE — 83880 ASSAY OF NATRIURETIC PEPTIDE: CPT

## 2024-07-08 PROCEDURE — 85025 COMPLETE CBC W/AUTO DIFF WBC: CPT

## 2024-07-09 ENCOUNTER — OFFICE VISIT (OUTPATIENT)
Dept: PAIN MEDICINE | Facility: CLINIC | Age: 84
End: 2024-07-09
Payer: MEDICARE

## 2024-07-09 VITALS — HEART RATE: 84 BPM | RESPIRATION RATE: 20 BRPM | DIASTOLIC BLOOD PRESSURE: 77 MMHG | SYSTOLIC BLOOD PRESSURE: 135 MMHG

## 2024-07-09 DIAGNOSIS — M54.16 LUMBAR RADICULITIS: ICD-10-CM

## 2024-07-09 DIAGNOSIS — M48.062 SPINAL STENOSIS OF LUMBAR REGION WITH NEUROGENIC CLAUDICATION: Primary | ICD-10-CM

## 2024-07-09 PROCEDURE — 3078F DIAST BP <80 MM HG: CPT | Performed by: ANESTHESIOLOGY

## 2024-07-09 PROCEDURE — 3075F SYST BP GE 130 - 139MM HG: CPT | Performed by: ANESTHESIOLOGY

## 2024-07-09 PROCEDURE — 1036F TOBACCO NON-USER: CPT | Performed by: ANESTHESIOLOGY

## 2024-07-09 PROCEDURE — 99213 OFFICE O/P EST LOW 20 MIN: CPT | Performed by: ANESTHESIOLOGY

## 2024-07-09 PROCEDURE — 1125F AMNT PAIN NOTED PAIN PRSNT: CPT | Performed by: ANESTHESIOLOGY

## 2024-07-09 PROCEDURE — 1159F MED LIST DOCD IN RCRD: CPT | Performed by: ANESTHESIOLOGY

## 2024-07-09 RX ORDER — HYDROCODONE BITARTRATE AND ACETAMINOPHEN 5; 325 MG/1; MG/1
1 TABLET ORAL 3 TIMES DAILY PRN
Qty: 84 TABLET | Refills: 0 | Status: SHIPPED | OUTPATIENT
Start: 2024-09-05 | End: 2024-10-03

## 2024-07-09 RX ORDER — HYDROCODONE BITARTRATE AND ACETAMINOPHEN 5; 325 MG/1; MG/1
1 TABLET ORAL 3 TIMES DAILY PRN
Qty: 84 TABLET | Refills: 0 | Status: SHIPPED | OUTPATIENT
Start: 2024-07-11

## 2024-07-09 RX ORDER — HYDROCODONE BITARTRATE AND ACETAMINOPHEN 5; 325 MG/1; MG/1
1 TABLET ORAL 3 TIMES DAILY PRN
Qty: 84 TABLET | Refills: 0 | Status: SHIPPED | OUTPATIENT
Start: 2024-08-08

## 2024-07-09 ASSESSMENT — PAIN DESCRIPTION - DESCRIPTORS: DESCRIPTORS: ACHING

## 2024-07-09 ASSESSMENT — PAIN - FUNCTIONAL ASSESSMENT: PAIN_FUNCTIONAL_ASSESSMENT: 0-10

## 2024-07-09 ASSESSMENT — PAIN SCALES - GENERAL: PAINLEVEL_OUTOF10: 3

## 2024-07-09 NOTE — PROGRESS NOTES
History Of Present Illness  Chel Lowe is a 84 y.o. female presenting with low back pain radiating to the posterior aspect of the left lower extremity.  Patient describes it as sharp shooting in nature not associated with any numbness or weakness.  Patient reports that her pain has significantly decreased over the last week.  Patient is able to stand more than 20 minutes and walk for 1 block without any difficulty.  MRI lumbosacral spine done in 2024 showed L1-L3 severe central canal stenosis with ligamentum flavum hypertrophy.  And an L5-S1 right disc bulge contacting the traversing S1 nerve roots.  Current pain medications include hydrocodone 5-3 25 1 tablet twice daily.  Patient is requesting if she can increase it to 3 times a day.   Today patient has no new complaints.  She reports undergoing a arthroscopy of her right knee in 2024 for which she increased her pain medications to 3 times daily that is providing her good relief.  Patient is not interested in any intervention at the present time.     Past Medical History  She has a past medical history of Endometrial cancer (Multi).    Surgical History  She has a past surgical history that includes  section, classic (2013); Shoulder surgery (2013); and Hysterectomy.     Social History  She reports that she has never smoked. She has never used smokeless tobacco. She reports that she does not drink alcohol and does not use drugs.    Family History  No family history on file.     Allergies  Patient has no known allergies.    Review of systems:   13-point review of systems done and negative except as noted in HPI      Physical Exam   Vital signs: Reviewed  Constitutional: No acute distress, well appearing and well nourished. Patient appears stated age.   Eyes: Conjunctiva non-icteric and eye lids are without obvious rash or drooping. Pupils are symmetric.   Ears, Nose, Mouth, and Throat: External ears and nose appear to be without  deformity or rash. No lesions or masses noted. Hearing is grossly intact.   Neck:. No JVD noted, tracheal position is midline.   Head and Face: Examination of the head and face revealed no abnormalities.   Respiratory: No gasping or shortness of breath noted, no use of accessory muscles noted.   Cardiovascular: Examination for edema is normal.   GI: Abdomen nontender to palpation.   Skin: No rashes or open lesions/ulcers identified on skin.   Musk: Digits/nails show no clubbing or cyanosis. No asymmetry or masses noted of the musculature. Examination of the muscles/joints/bones show normal range of motion. Gait is grossly [].  [] to walk on toes and heels.   Neurologic: Cranial nerves II-XII intact, motor strength 5/5 muscle strength of the lower extremities bilaterally and equal. 5/5 muscle strength of the upper extremities bilaterally and equal.   Reflexes: normal.   Sensation: []  Provocative tests:       Last Recorded Vitals  /77   Pulse 84   Resp 20     Relevant Results            Last OARRS Review: No data recorded    I have personally reviewed the OARRS report for Chel Lowe I have considered the risks of abuse, dependence, addiction and diversion  Overdose risk score is 40 and her morphine milliequivalents is 15 mg/day     Assessment/Plan   Diagnoses and all orders for this visit:  Spinal stenosis of lumbar region with neurogenic claudication  -     HYDROcodone-acetaminophen (Norco) 5-325 mg tablet; Take 1 tablet by mouth 3 times a day as needed for severe pain (7 - 10). Do not fill before July 11, 2024.  -     HYDROcodone-acetaminophen (Norco) 5-325 mg tablet; Take 1 tablet by mouth 3 times a day as needed for severe pain (7 - 10). Do not fill before August 8, 2024.  -     HYDROcodone-acetaminophen (Norco) 5-325 mg tablet; Take 1 tablet by mouth 3 times a day as needed for severe pain (7 - 10) for up to 28 days. Do not fill before September 5, 2024.  Lumbar radiculitis  -      HYDROcodone-acetaminophen (Norco) 5-325 mg tablet; Take 1 tablet by mouth 3 times a day as needed for severe pain (7 - 10). Do not fill before July 11, 2024.      Plan  Hydrocodone 5/325 1 tablet 3 times daily       Chang Murray MD

## 2024-07-16 ENCOUNTER — APPOINTMENT (OUTPATIENT)
Dept: PAIN MEDICINE | Facility: CLINIC | Age: 84
End: 2024-07-16
Payer: MEDICARE

## 2024-08-01 PROCEDURE — RXMED WILLOW AMBULATORY MEDICATION CHARGE

## 2024-08-02 ENCOUNTER — PHARMACY VISIT (OUTPATIENT)
Dept: PHARMACY | Facility: CLINIC | Age: 84
End: 2024-08-02
Payer: COMMERCIAL

## 2024-08-02 ENCOUNTER — PHARMACY VISIT (OUTPATIENT)
Dept: PHARMACY | Facility: CLINIC | Age: 84
End: 2024-08-02
Payer: MEDICARE

## 2024-08-02 PROCEDURE — RXMED WILLOW AMBULATORY MEDICATION CHARGE

## 2024-08-02 RX ORDER — PANTOPRAZOLE SODIUM 40 MG/1
40 TABLET, DELAYED RELEASE ORAL DAILY
Qty: 90 TABLET | Refills: 3 | OUTPATIENT
Start: 2024-08-02

## 2024-08-09 PROCEDURE — RXMED WILLOW AMBULATORY MEDICATION CHARGE

## 2024-08-10 ENCOUNTER — PHARMACY VISIT (OUTPATIENT)
Dept: PHARMACY | Facility: CLINIC | Age: 84
End: 2024-08-10
Payer: COMMERCIAL

## 2024-08-26 ENCOUNTER — LAB REQUISITION (OUTPATIENT)
Dept: LAB | Facility: HOSPITAL | Age: 84
End: 2024-08-26
Payer: MEDICARE

## 2024-08-26 DIAGNOSIS — I50.9 HEART FAILURE, UNSPECIFIED (MULTI): ICD-10-CM

## 2024-08-26 DIAGNOSIS — I10 ESSENTIAL (PRIMARY) HYPERTENSION: ICD-10-CM

## 2024-08-26 LAB
ANION GAP SERPL CALC-SCNC: 12 MMOL/L (ref 10–20)
BNP SERPL-MCNC: 363 PG/ML (ref 0–99)
BUN SERPL-MCNC: 12 MG/DL (ref 6–23)
CALCIUM SERPL-MCNC: 10 MG/DL (ref 8.6–10.3)
CHLORIDE SERPL-SCNC: 106 MMOL/L (ref 98–107)
CO2 SERPL-SCNC: 25 MMOL/L (ref 21–32)
CREAT SERPL-MCNC: 0.91 MG/DL (ref 0.5–1.05)
EGFRCR SERPLBLD CKD-EPI 2021: 62 ML/MIN/1.73M*2
GLUCOSE SERPL-MCNC: 109 MG/DL (ref 74–99)
POTASSIUM SERPL-SCNC: 4.3 MMOL/L (ref 3.5–5.3)
SODIUM SERPL-SCNC: 139 MMOL/L (ref 136–145)

## 2024-08-26 PROCEDURE — 80048 BASIC METABOLIC PNL TOTAL CA: CPT

## 2024-08-26 PROCEDURE — 83880 ASSAY OF NATRIURETIC PEPTIDE: CPT

## 2024-08-27 ENCOUNTER — TELEMEDICINE (OUTPATIENT)
Dept: CARDIOLOGY | Facility: CLINIC | Age: 84
End: 2024-08-27
Payer: MEDICARE

## 2024-08-27 DIAGNOSIS — I48.0 PAROXYSMAL ATRIAL FIBRILLATION (MULTI): ICD-10-CM

## 2024-08-27 PROCEDURE — 99214 OFFICE O/P EST MOD 30 MIN: CPT | Performed by: INTERNAL MEDICINE

## 2024-08-27 RX ORDER — DAPAGLIFLOZIN 10 MG/1
10 TABLET, FILM COATED ORAL DAILY
Qty: 90 TABLET | Refills: 3 | Status: SHIPPED | OUTPATIENT
Start: 2024-08-27 | End: 2025-08-27

## 2024-08-27 RX ORDER — DAPAGLIFLOZIN 5 MG/1
5 TABLET, FILM COATED ORAL DAILY
Qty: 5 TABLET | Refills: 0 | Status: SHIPPED | OUTPATIENT
Start: 2024-08-27 | End: 2024-09-01

## 2024-08-27 NOTE — PROGRESS NOTES
Primary Care Physician: Edward Rogers MD  Primary Cardiologist: Sis Hamilton MD      Date of Visit: 08/27/2024  1:30 PM EDT  Location of visit:  [facility]   Type of Visit: Follow up        Dear Dr Rogers,     DIAGNOSES: PAF. SVT. HTN. Preserved LV systolic function. HFpEF. NYHA Class II.       HPI / Summary:   Chel Lowe is a 84 y.o. female  who returns for routine tele follow up.  She was found to have elevated BNP in 200s to 300s on routine checkup in the hospital.  She has baseline class II exertional shortness of breath echo showing preserved LV systolic function.  She denies any chest pain, palpitations, dizziness or syncope.      12 system review is negative except as noted above  Accompanied by her  who contributed to the history       Medical History:   Past Medical History:   Diagnosis Date    Endometrial cancer (Multi)        Social History:   Tobacco Use: Low Risk  (7/9/2024)    Patient History     Smoking Tobacco Use: Never     Smokeless Tobacco Use: Never     Passive Exposure: Not on file         MEDICATIONS:   Current Outpatient Medications   Medication Instructions    acetaminophen (TYLENOL) 650 mg, oral, Every 4 hours PRN    ascorbic acid (Vitamin C) 500 mg tablet oral    aspirin 81 mg EC tablet oral, Daily RT    calcium carbonate-vitamin D3 600 mg-5 mcg (200 unit) tablet 1 tablet, oral, 2 times daily    clotrimazole-betamethasone (Lotrisone) cream 1 Application, Topical, 2 times daily    estradiol (Estrace) 1 mg tablet 1 tablet, oral, Daily    furosemide (LASIX) 20 mg, oral, Daily    gabapentin (Neurontin) 300 mg capsule oral    glucosamine-chondroit-vit C-Mn 500-400 mg capsule 1 capsule, oral, 2 times daily    HYDROcodone-acetaminophen (Norco) 5-325 mg tablet 1 tablet, oral, 3 times daily PRN    HYDROcodone-acetaminophen (Norco) 5-325 mg tablet 1 tablet, oral, 3 times daily PRN    [START ON 9/5/2024] HYDROcodone-acetaminophen (Norco) 5-325 mg tablet 1 tablet, oral, 3 times  daily PRN    magnesium gluconate (Magonate) 27.5 mg magne- sium (500 mg) tablet 55 mg, oral, 2 times daily    methocarbamol (ROBAXIN) 750 mg, oral, 3 times daily PRN    metoprolol tartrate (Lopressor) 50 mg tablet TAKE 1 TABLET BY MOUTH TWO TIMES A DAY    METOPROLOL TARTRATE ORAL oral    multivitamin tablet oral    naloxone (Narcan) 4 mg/0.1 mL nasal spray nasal, Used as directed    nirmatrelvir-ritonavir (Paxlovid) 300 mg (150 mg x 2)-100 mg tablet therapy pack 2 tablets, oral, 2 times daily    pantoprazole (PROTONIX) 20 mg, oral, Daily RT    pantoprazole (PROTONIX) 40 mg, oral, Daily    pantoprazole (PROTONIX) 40 mg, oral, Daily    predniSONE (Deltasone) 20 mg tablet Take one tab po bid for 5 days    progesterone (Prometrium) 100 mg capsule 1 capsule, oral, Daily    risedronate (Actonel) 35 mg tablet TAKE 1 TABLET BY MOUTH ONCE WEEKLY- TAKE WITH A FULL GLASS OF WATER ON AN EMPTY STOMACH. DO NOT LIE DOWN, EAT OR DRINK FOR 30 MINUTES.    risedronate (Actonel) 35 mg tablet Take 1 tablet by mouth once a week in morning with full glass of water on an empty stomach. No food, drink, meds, or lying down for 30 minutes after.    rivaroxaban (Xarelto) 20 mg tablet TAKE 1 TABLET BY MOUTH ONE TIME DAILY    spironolactone (Aldactone) 25 mg tablet 1 tablet, oral, Daily    spironolactone (ALDACTONE) 50 mg, oral, Daily    zinc gluconate 100 mg tablet 1 tablet, oral, Daily         IMAGING REVIEWED:   Echocardiogram:   Echocardiogram     [facility], [address] [place] [address]  Tel [phone] and Fax [phone]    TRANSTHORACIC ECHOCARDIOGRAM REPORT      Patient Name:     AMANUEL Neves Physician:  [id number] Sis Hamilton MD  Study Date:       6/30/2022          Referring           ESA VELARDE  Physician:  MRN/PID:          [id number]           PCP:                Edward Rogers MD  Accession/Order#: [id number]          Department          [facility]  Location:  Date of  Birth:    1940           Fellow:  Gender:           F                  Nurse:  Admit Date:       6/29/2022          Sonographer:        Isabela Lawrence RVT,  RDMS, RDCS  Admission Status: Inpatient -        Additional Staff:  Routine  Height:           162.56 cm          CC Report to:       [facility]  Weight:           69.85 kg           Study Type:         Echocardiogram  BSA:              1.75 m2  Blood Pressure: 131 /75 mmHg    Diagnosis/ICD: R07.9-Chest pain, unspecified  Indication:    Chest Pain  Procedure/CPT: Echo Complete w Full [id number]    Patient History:  Pertinent History: HTN, Previous SVT and Chest Pain. GERD, Arrhythmia.    Study Detail: The following Echo studies were performed: 2D, M-Mode, Doppler and  color flow. The patient was awake.      PHYSICIAN INTERPRETATION:  Left Ventricle: The left ventricular systolic function is normal, with an estimated ejection fraction of 55-60%. There are no regional wall motion abnormalities. The left ventricular cavity size is normal. Left ventricular diastolic filling was indeterminate.  Left Atrium: The left atrium is mildly dilated.  Right Ventricle: The right ventricle is normal in size. There is normal right ventricular global systolic function.  Right Atrium: The right atrium is normal in size.  Aortic Valve: The aortic valve is trileaflet. There is no evidence of aortic valve regurgitation. The peak instantaneous gradient of the aortic valve is 6.3 mmHg.  Mitral Valve: The mitral valve is normal in structure. There is no evidence of mitral valve regurgitation.  Tricuspid Valve: The tricuspid valve is structurally normal. There is mild tricuspid regurgitation.  Pulmonic Valve: The pulmonic valve is not well visualized. There is physiologic pulmonic valve regurgitation.  Pericardium: There is no pericardial effusion noted.  Aorta: The aortic root was not well visualized.      CONCLUSIONS:  1. The left ventricular systolic function is  normal with a 55-60% estimated ejection fraction.    QUANTITATIVE DATA SUMMARY:  2D MEASUREMENTS:  Normal Ranges:  LAs:           3.65 cm   (2.7-4.0cm)  IVSd:          1.21 cm   (0.6-1.1cm)  LVPWd:         1.05 cm   (0.6-1.1cm)  LVIDd:         4.10 cm   (3.9-5.9cm)  LVIDs:         2.27 cm  LV Mass Index: 89.8 g/m2  LV % FS        44.7 %    LA VOLUME:  Normal Ranges:  LA Vol A4C:        48.2 ml    (22+/-6mL/m2)  LA Vol A2C:        54.6 ml  LA Vol BP:         52.9 ml  LA Vol Index A4C:  27.5ml/m2  LA Vol Index A2C:  31.2 ml/m2  LA Vol Index BP:   30.2 ml/m2  LA Area A4C:       16.5 cm2  LA Area A2C:       18.1 cm2  LA Major Axis A4C: 4.8 cm  LA Major Axis A2C: 5.1 cm  LA Volume Index:   30.3 ml/m2  LA Vol A4C:        45.1 ml  LA Vol A2C:        53.1 ml    RA VOLUME BY A/L METHOD:  Normal Ranges:  RA Vol A4C:        31.2 ml    (8.3-19.5ml)  RA Vol Index A4C:  17.8 ml/m2  RA Area A4C:       13.0 cm2  RA Major Axis A4C: 4.6 cm    M-MODE MEASUREMENTS:  Normal Ranges:  Ao Root: 2.90 cm (2.0-3.7cm)  LAs:     3.96 cm (2.7-4.0cm)    AORTA MEASUREMENTS:  Normal Ranges:  Ao Sinus, d: 3.00 cm (2.1-3.5cm)  Asc Ao, d:   3.00 cm (2.1-3.4cm)    LV SYSTOLIC FUNCTION BY 2D PLANIMETRY (MOD):  Normal Ranges:  EF-A4C View: 49.8 % (>55%)  EF-A2C View: 51.0 %  EF-Biplane:  49.6 %    LV DIASTOLIC FUNCTION:  Normal Ranges:  MV Peak E:        1.11 m/s    (0.7-1.2 m/s)  MV Peak A:        0.60 m/s    (0.42-0.7 m/s)  E/A Ratio:        1.83        (1.0-2.2)  MV e'             0.08 m/s    (>8.0)  MV lateral e'     0.10 m/s  MV medial e'      0.07 m/s  MV A Dur:         73.82 msec  E/e' Ratio:       13.02       (<8.0)  PulmV Sys David:    89.00 cm/s  PulmV Rodríguez David:   52.84 cm/s  PulmV S/D David:    1.68  PulmV A Revs David: 30.56 cm/s  PulmV A Revs Dur: 106.11 msec    MITRAL VALVE:  Normal Ranges:  MV DT: 182 msec (150-240msec)    AORTIC VALVE:  Normal Ranges:  AoV Vmax:      1.26 m/s (<1.7m/s)  AoV Peak P.3 mmHg (<20mmHg)  LVOT Max David:  1.08 m/s  (<1.1m/s)  LVOT VTI:      23.22 cm  LVOT Diameter: 1.90 cm  (1.8-2.4cm)  AoV Area,Vmax: 2.44 cm2 (2.5-4.5cm2)    RIGHT VENTRICLE:  RV 1   3.2 cm  RV 2   1.9 cm  RV 3   5.1 cm  TAPSE: 21.0 mm  RV s'  0.19 m/s    TRICUSPID VALVE/RVSP:  Normal Ranges:  Peak TR Velocity: 2.79 m/s  Est. RA Pressure: 3 mmHg.  RV Syst Pressure: 34.0 mmHg (< 30mmHg)  IVC Diam:         1.88 cm    PULMONIC VALVE:  Normal Ranges:  PV Max David: 1.0 m/s  (0.6-0.9m/s)  PV Max PG:  3.7 mmHg    Pulmonary Veins:  PulmV A Revs Dur: 106.11 msec  PulmV A Revs David: 30.56 cm/s  PulmV Rodríguez David:   52.84 cm/s  PulmV S/D David:    1.68  PulmV Sys David:    89.00 cm/s    AORTA:  Asc Ao Diam 3.00 cm      [id number] Sis Hamilton MD  Electronically signed on 6/30/2022 at 4:55:45 PM        I did verify I I was [facility] about [facility] but I did have a short the patient has been        LABS:  CBC with Differential:    Lab Results   Component Value Date    WBC 9.3 07/08/2024    RBC 4.66 07/08/2024    HGB 14.8 07/08/2024    HCT 46.4 (H) 07/08/2024     07/08/2024     07/08/2024    MCH 31.8 07/08/2024    MCHC 31.9 (L) 07/08/2024    RDW 13.8 07/08/2024    NRBC 0.0 07/08/2024    LYMPHOPCT 19.0 07/08/2024    MONOPCT 7.4 07/08/2024    EOSPCT 1.6 07/08/2024    BASOPCT 0.3 07/08/2024    MONOSABS 0.69 07/08/2024    LYMPHSABS 1.77 07/08/2024    EOSABS 0.15 07/08/2024    BASOSABS 0.03 07/08/2024     CMP:    Lab Results   Component Value Date     08/26/2024    K 4.3 08/26/2024     08/26/2024    CO2 25 08/26/2024    BUN 12 08/26/2024    CREATININE 0.91 08/26/2024    GLUCOSE 109 (H) 08/26/2024    PROT 6.4 07/01/2024    CALCIUM 10.0 08/26/2024    BILITOT 1.2 07/01/2024    ALKPHOS 37 07/01/2024    AST 22 07/01/2024    ALT 22 07/01/2024     BMP:    Lab Results   Component Value Date     08/26/2024    K 4.3 08/26/2024     08/26/2024    CO2 25 08/26/2024    BUN 12 08/26/2024    CREATININE 0.91 08/26/2024    CALCIUM 10.0 08/26/2024     GLUCOSE 109 (H) 08/26/2024     Magnesium:  Lab Results   Component Value Date    MG 2.07 04/27/2021     Troponin:    Lab Results   Component Value Date    TROPHS 15 (H) 06/29/2022    TROPHS 16 (H) 06/29/2022     BNP:   Lab Results   Component Value Date     (H) 08/26/2024         Lipid Panel:  Lab Results   Component Value Date    HDL 48.4 12/30/2023    CHHDL 3.4 12/30/2023    VLDL 25 12/30/2023    TRIG 124 12/30/2023    NHDL 117 12/30/2023        Lab work and imaging results independently reviewed by me     Visit Vitals  OB Status Postmenopausal   Smoking Status Never     DIAGNOSES: PAF. SVT. HTN. Preserved LV systolic function. HFpEF. NYHA Class II.     I reassured her from a cardiac perspective.  I have initiated her on dapagliflozin (Farxiga) 5 mg daily, for 2 weeks followed by 10 mg daily to continue, for her HFpEF.  I encouraged her to continue with the normal activities as tolerated.  I also requested her to be careful with the salt intake.    Thank you so much for the opportunity to participate in the care of this very pleasant lady. Please do not hesitate to contact me if I could be of any assistance in her future care.    Sincerely        Angeli Hamilton M.D.          08/27/24 at 4:58 PM - Sis Hamilton MD      Orders:  Orders Placed This Encounter   Procedures    Referral to Clinical Pharmacy         Followup Appts:  Future Appointments   Date Time Provider Department Center   10/7/2024 10:00 AM Sis Hamilton MD [id number] [facility]   10/8/2024  1:00 PM [facility]   10/8/2024  2:00 PM Marjan Mercedes MD [id number] [facility]   12/17/2024  1:30 PM [facility]   3/3/2025  1:00 PM Sis Hamilton MD [id number] [facility]

## 2024-09-03 DIAGNOSIS — R94.31 ABNORMAL EKG: ICD-10-CM

## 2024-09-03 DIAGNOSIS — I48.0 PAROXYSMAL ATRIAL FIBRILLATION (MULTI): Primary | ICD-10-CM

## 2024-09-03 RX ORDER — METOPROLOL SUCCINATE 200 MG/1
200 TABLET, EXTENDED RELEASE ORAL DAILY
Qty: 30 TABLET | Refills: 11 | Status: SHIPPED | OUTPATIENT
Start: 2024-09-03 | End: 2025-09-03

## 2024-09-04 ENCOUNTER — APPOINTMENT (OUTPATIENT)
Dept: PHARMACY | Facility: HOSPITAL | Age: 84
End: 2024-09-04
Payer: MEDICARE

## 2024-09-04 DIAGNOSIS — I48.0 PAROXYSMAL ATRIAL FIBRILLATION (MULTI): ICD-10-CM

## 2024-09-04 PROCEDURE — RXMED WILLOW AMBULATORY MEDICATION CHARGE

## 2024-09-04 RX ORDER — DAPAGLIFLOZIN 10 MG/1
10 TABLET, FILM COATED ORAL DAILY
Qty: 90 TABLET | Refills: 3 | Status: SHIPPED | OUTPATIENT
Start: 2024-09-04

## 2024-09-04 NOTE — PROGRESS NOTES
Clinical Pharmacy Appointment    Patient ID: Chel Lowe is a 84 y.o. female who presents for First appointment.     Referring Provider: Sis Hamilton MD  PCP: Edward Rogers MD     Subjective     CONGESTIVE HEART FAILURE ASSESSMENT  Does patient follow with Cardiology: Yes    Staging  Ejection Fraction: 55-60% (1/8/21)    Medication Therapy  Current Regimen (GDMT):  ARNI/ACEi/ARB: No  Beta Blocker: Yes - metoprolol succinate 200 mg daily   MRA: Yes - Spironolactone 50 mg daily   SGLT2i: Yes - Farxiga 5 mg daily, to increase to 10 mg daily    Other therapy:  Furosemide 20 mg daily     Adverse Effects: None    Secondary Prevention  The ASCVD Risk score (Lidia DK, et al., 2019) failed to calculate for the following reasons:    The 2019 ASCVD risk score is only valid for ages 40 to 79  Aspirin 81mg? no  Statin?: No  HTN?: Yes - controlled      Medication Reconciliation:  Changed:   None  Added:   Multivitamin - centrum   Discontinued:   Gabapentin  Lotrisone cream   Estradiol   Aspirin   Vitamin C  Acetaminophen   Duplicate metoprolol tartrate  Paxlovid  Duplicate pantoprazole 40 mg   Pantoprazole 20 mg   Prednisone   Spironolactone 25 mg   Zinc     Drug Interactions  No relevant drug interactions were noted.    Medication System Management  Patient's preferred pharmacy: [facility]   Adherence/Organization: Takes medication as directed and reports no missed doses  Affordability/Accessibility: Farxiga expensive. Has prescription insurance through medicare part D. Rivaroxaban through  program.     Objective   No Known Allergies  Social History     Social History Narrative    Not on file      Medication Review  Current Outpatient Medications   Medication Instructions    calcium carbonate-vitamin D3 600 mg-5 mcg (200 unit) tablet 1 tablet, oral, 2 times daily    dapagliflozin propanediol (FARXIGA) 5 mg, oral, Daily    dapagliflozin propanediol (FARXIGA) 10 mg, oral, Daily    furosemide (LASIX)  "20 mg, oral, Daily    glucosamine-chondroit-vit C-Mn 500-400 mg capsule 1 capsule, oral, 2 times daily    HYDROcodone-acetaminophen (Norco) 5-325 mg tablet 1 tablet, oral, 3 times daily PRN    HYDROcodone-acetaminophen (Norco) 5-325 mg tablet 1 tablet, oral, 3 times daily PRN    [START ON 9/5/2024] HYDROcodone-acetaminophen (Norco) 5-325 mg tablet 1 tablet, oral, 3 times daily PRN    magnesium gluconate (Magonate) 27.5 mg magne- sium (500 mg) tablet 55 mg, oral, 2 times daily    methocarbamol (ROBAXIN) 750 mg, oral, 3 times daily PRN    metoprolol succinate XL (TOPROL XL) 200 mg, oral, Daily, Do not crush or chew.    metoprolol tartrate (Lopressor) 50 mg tablet TAKE 1 TABLET BY MOUTH TWO TIMES A DAY    multivitamin tablet oral    naloxone (Narcan) 4 mg/0.1 mL nasal spray nasal, Used as directed    pantoprazole (PROTONIX) 40 mg, oral, Daily    risedronate (Actonel) 35 mg tablet TAKE 1 TABLET BY MOUTH ONCE WEEKLY- TAKE WITH A FULL GLASS OF WATER ON AN EMPTY STOMACH. DO NOT LIE DOWN, EAT OR DRINK FOR 30 MINUTES.    risedronate (Actonel) 35 mg tablet Take 1 tablet by mouth once a week in morning with full glass of water on an empty stomach. No food, drink, meds, or lying down for 30 minutes after.    rivaroxaban (Xarelto) 20 mg tablet TAKE 1 TABLET BY MOUTH ONE TIME DAILY    spironolactone (ALDACTONE) 50 mg, oral, Daily      Vitals  BP Readings from Last 2 Encounters:   07/09/24 135/77   04/23/24 121/75     BMI Readings from Last 1 Encounters:   04/23/24 26.74 kg/m²      Labs  A1C  No results found for: \"HGBA1C\"  BMP  Lab Results   Component Value Date    CALCIUM 10.0 08/26/2024     08/26/2024    K 4.3 08/26/2024    CO2 25 08/26/2024     08/26/2024    BUN 12 08/26/2024    CREATININE 0.91 08/26/2024    EGFR 62 08/26/2024     LFTs  Lab Results   Component Value Date    ALT 22 07/01/2024    AST 22 07/01/2024    ALKPHOS 37 07/01/2024    BILITOT 1.2 07/01/2024     FLP  Lab Results   Component Value Date    TRIG " "124 12/30/2023    CHOL 165 12/30/2023    LDLF 89 12/13/2022    LDLCALC 92 12/30/2023    HDL 48.4 12/30/2023     Urine Microalbumin  No results found for: \"MICROALBCREA\"  Weight Management  Wt Readings from Last 3 Encounters:   04/23/24 70.7 kg (155 lb 12.1 oz)   03/06/24 69.4 kg (153 lb)   11/07/23 69.7 kg (153 lb 10.6 oz)      There is no height or weight on file to calculate BMI.     Assessment/Plan   Problem List Items Addressed This Visit       Paroxysmal atrial fibrillation (Multi)     Patient has HFpEF with most recent EF 55-60% on 1/8/21. Of note, patient has an upcoming Echo on Monday 9/9/24.     Rationale for plan: Patient recently started taking Farxiga 5 mg daily with planned titration to 10 mg daily. Reports starting therapy about 1 week ago. At this time, patient denies signs or symptoms of UTI or yeast infections. No adverse events reported. Patient endorses high copay associated with medication and paid $85 for 5 tablets. Patient has already submitted financials and appears to be eligible for  PAP. Will submit application today.      Medication Changes: None   CONTINUE:  Farxiga 10 mg daily  Metoprolol succinate 200 mg daily   Spironolactone 50 mg daily   Furosemide 20 mg daily     Monitoring and Education:  Counseled patient on Farxiga MOA, expectations, side effects, duration of therapy, administration, and monitoring parameters.  Advised patient to practice proper  hygiene to reduce risk of UTIs or yeast infections.  Advised patient to maintain adequate fluid intake to remain hydrated while on SGLT2i therapy.  Answered all patient questions and concerns.      Patient Assistance Screening (VAF)  Patient verbally reports monthly or yearly income which is less than 400% federal poverty level. Financials submitted and received prior to visit.   If approved medication must be filled through [facility] pharmacy and may be picked up or mailed to patient.   Application for program has been submitted " for the following medications: Farxiga    Clinical Pharmacist follow-up: as needed for PAP enrollment, Telehealth visit    Continue all meds under the continuation of care with the referring provider and clinical pharmacy team.    Thank you,  Rocío Temple, PharmD  Clinical Pharmacist  [phone]    Verbal consent to manage patient's drug therapy was obtained from the patient. They were informed they may decline to participate or withdraw from participation in pharmacy services at any time.

## 2024-09-06 ENCOUNTER — PHARMACY VISIT (OUTPATIENT)
Dept: PHARMACY | Facility: CLINIC | Age: 84
End: 2024-09-06
Payer: COMMERCIAL

## 2024-09-09 ENCOUNTER — HOSPITAL ENCOUNTER (OUTPATIENT)
Dept: CARDIOLOGY | Facility: HOSPITAL | Age: 84
Discharge: HOME | End: 2024-09-09
Payer: MEDICARE

## 2024-09-09 VITALS — DIASTOLIC BLOOD PRESSURE: 71 MMHG | SYSTOLIC BLOOD PRESSURE: 114 MMHG

## 2024-09-09 DIAGNOSIS — R94.31 ABNORMAL EKG: ICD-10-CM

## 2024-09-09 LAB
AORTIC VALVE PEAK VELOCITY: 1.13 M/S
AV PEAK GRADIENT: 5.1 MMHG
AVA (PEAK VEL): 2.3 CM2
EJECTION FRACTION APICAL 4 CHAMBER: 68.4
EJECTION FRACTION: 68 %
LEFT ATRIUM VOLUME AREA LENGTH INDEX BSA: 26.2 ML/M2
LEFT VENTRICLE INTERNAL DIMENSION DIASTOLE: 4.21 CM (ref 3.5–6)
LEFT VENTRICULAR OUTFLOW TRACT DIAMETER: 1.91 CM
MITRAL VALVE E/A RATIO: 1.03
RIGHT VENTRICLE FREE WALL PEAK S': 13 CM/S
RIGHT VENTRICLE PEAK SYSTOLIC PRESSURE: 33.6 MMHG
TRICUSPID ANNULAR PLANE SYSTOLIC EXCURSION: 2 CM

## 2024-09-09 PROCEDURE — 93306 TTE W/DOPPLER COMPLETE: CPT | Performed by: INTERNAL MEDICINE

## 2024-09-09 PROCEDURE — 93306 TTE W/DOPPLER COMPLETE: CPT

## 2024-09-11 DIAGNOSIS — I48.0 PAROXYSMAL ATRIAL FIBRILLATION (MULTI): ICD-10-CM

## 2024-09-11 RX ORDER — METOPROLOL TARTRATE 50 MG/1
TABLET ORAL
Start: 2024-09-11

## 2024-09-27 DIAGNOSIS — I48.91 ATRIAL FIBRILLATION, UNSPECIFIED TYPE (MULTI): ICD-10-CM

## 2024-09-28 PROCEDURE — RXMED WILLOW AMBULATORY MEDICATION CHARGE

## 2024-09-30 ENCOUNTER — PHARMACY VISIT (OUTPATIENT)
Dept: PHARMACY | Facility: CLINIC | Age: 84
End: 2024-09-30
Payer: COMMERCIAL

## 2024-10-07 ENCOUNTER — APPOINTMENT (OUTPATIENT)
Dept: CARDIOLOGY | Facility: CLINIC | Age: 84
End: 2024-10-07
Payer: MEDICARE

## 2024-10-08 ENCOUNTER — OFFICE VISIT (OUTPATIENT)
Dept: GYNECOLOGIC ONCOLOGY | Facility: CLINIC | Age: 84
End: 2024-10-08
Payer: MEDICARE

## 2024-10-08 ENCOUNTER — OFFICE VISIT (OUTPATIENT)
Dept: PAIN MEDICINE | Facility: CLINIC | Age: 84
End: 2024-10-08
Payer: MEDICARE

## 2024-10-08 ENCOUNTER — TELEPHONE (OUTPATIENT)
Dept: CARDIOLOGY | Facility: HOSPITAL | Age: 84
End: 2024-10-08
Payer: MEDICARE

## 2024-10-08 VITALS
TEMPERATURE: 97.9 F | SYSTOLIC BLOOD PRESSURE: 124 MMHG | HEART RATE: 68 BPM | BODY MASS INDEX: 25.79 KG/M2 | OXYGEN SATURATION: 96 % | RESPIRATION RATE: 17 BRPM | DIASTOLIC BLOOD PRESSURE: 73 MMHG | WEIGHT: 150.24 LBS

## 2024-10-08 VITALS
RESPIRATION RATE: 18 BRPM | DIASTOLIC BLOOD PRESSURE: 75 MMHG | OXYGEN SATURATION: 96 % | HEART RATE: 77 BPM | SYSTOLIC BLOOD PRESSURE: 129 MMHG

## 2024-10-08 DIAGNOSIS — R94.31 ABNORMAL EKG: Primary | ICD-10-CM

## 2024-10-08 DIAGNOSIS — M54.16 LUMBAR RADICULITIS: ICD-10-CM

## 2024-10-08 DIAGNOSIS — I10 BENIGN HYPERTENSION: ICD-10-CM

## 2024-10-08 DIAGNOSIS — Z08 ENCOUNTER FOR FOLLOW-UP SURVEILLANCE OF UTERINE CANCER: ICD-10-CM

## 2024-10-08 DIAGNOSIS — Z85.42 ENCOUNTER FOR FOLLOW-UP SURVEILLANCE OF UTERINE CANCER: ICD-10-CM

## 2024-10-08 DIAGNOSIS — M48.062 SPINAL STENOSIS OF LUMBAR REGION WITH NEUROGENIC CLAUDICATION: ICD-10-CM

## 2024-10-08 DIAGNOSIS — I48.0 PAROXYSMAL ATRIAL FIBRILLATION (MULTI): ICD-10-CM

## 2024-10-08 DIAGNOSIS — C54.1 ENDOMETRIAL CARCINOMA (MULTI): Primary | ICD-10-CM

## 2024-10-08 PROBLEM — C54.9 MALIGNANT NEOPLASM OF CORPUS UTERI, UNSPECIFIED: Status: RESOLVED | Noted: 2023-11-03 | Resolved: 2024-10-08

## 2024-10-08 PROCEDURE — 3078F DIAST BP <80 MM HG: CPT | Performed by: STUDENT IN AN ORGANIZED HEALTH CARE EDUCATION/TRAINING PROGRAM

## 2024-10-08 PROCEDURE — 3074F SYST BP LT 130 MM HG: CPT | Performed by: STUDENT IN AN ORGANIZED HEALTH CARE EDUCATION/TRAINING PROGRAM

## 2024-10-08 PROCEDURE — 3074F SYST BP LT 130 MM HG: CPT | Performed by: ANESTHESIOLOGY

## 2024-10-08 PROCEDURE — 3078F DIAST BP <80 MM HG: CPT | Performed by: ANESTHESIOLOGY

## 2024-10-08 PROCEDURE — 1159F MED LIST DOCD IN RCRD: CPT | Performed by: STUDENT IN AN ORGANIZED HEALTH CARE EDUCATION/TRAINING PROGRAM

## 2024-10-08 PROCEDURE — 1159F MED LIST DOCD IN RCRD: CPT | Performed by: ANESTHESIOLOGY

## 2024-10-08 PROCEDURE — 1160F RVW MEDS BY RX/DR IN RCRD: CPT | Performed by: STUDENT IN AN ORGANIZED HEALTH CARE EDUCATION/TRAINING PROGRAM

## 2024-10-08 PROCEDURE — 99214 OFFICE O/P EST MOD 30 MIN: CPT | Performed by: STUDENT IN AN ORGANIZED HEALTH CARE EDUCATION/TRAINING PROGRAM

## 2024-10-08 PROCEDURE — 99213 OFFICE O/P EST LOW 20 MIN: CPT | Performed by: ANESTHESIOLOGY

## 2024-10-08 PROCEDURE — 1126F AMNT PAIN NOTED NONE PRSNT: CPT | Performed by: STUDENT IN AN ORGANIZED HEALTH CARE EDUCATION/TRAINING PROGRAM

## 2024-10-08 RX ORDER — HYDROCODONE BITARTRATE AND ACETAMINOPHEN 5; 325 MG/1; MG/1
1 TABLET ORAL 3 TIMES DAILY PRN
Qty: 84 TABLET | Refills: 0 | Status: SHIPPED | OUTPATIENT
Start: 2024-10-08 | End: 2024-11-05

## 2024-10-08 RX ORDER — HYDROCODONE BITARTRATE AND ACETAMINOPHEN 5; 325 MG/1; MG/1
1 TABLET ORAL 3 TIMES DAILY PRN
Qty: 84 TABLET | Refills: 0 | Status: SHIPPED | OUTPATIENT
Start: 2024-11-05

## 2024-10-08 RX ORDER — HYDROCODONE BITARTRATE AND ACETAMINOPHEN 5; 325 MG/1; MG/1
1 TABLET ORAL 3 TIMES DAILY PRN
Qty: 84 TABLET | Refills: 0 | Status: SHIPPED | OUTPATIENT
Start: 2024-12-03

## 2024-10-08 RX ORDER — NALOXONE HYDROCHLORIDE 4 MG/.1ML
1 SPRAY NASAL AS NEEDED
Qty: 2 EACH | Refills: 0 | Status: SHIPPED | OUTPATIENT
Start: 2024-10-08

## 2024-10-08 ASSESSMENT — PAIN - FUNCTIONAL ASSESSMENT: PAIN_FUNCTIONAL_ASSESSMENT: 0-10

## 2024-10-08 ASSESSMENT — PAIN SCALES - GENERAL
PAINLEVEL: 0-NO PAIN
PAINLEVEL_OUTOF10: 5 - MODERATE PAIN

## 2024-10-08 ASSESSMENT — PAIN DESCRIPTION - DESCRIPTORS: DESCRIPTORS: ACHING

## 2024-10-08 NOTE — TELEPHONE ENCOUNTER
Pt called stating she has been more fatigue and not felling herself. States she had declined a heart monitor in the past but would like one now. Monitor and EKG ordered and scheduled. Pt advised for worsening or new symptome's to report to closest ed.

## 2024-10-08 NOTE — PROGRESS NOTES
Patient ID: Chel Lowe is a 84 y.o. female.  Referring Physician: No referring provider defined for this encounter.  Primary Care Provider: Edward Rogers MD    Subjective    Interval History:  Her  has been dealing with some health issues. Notes she was initiated on Farxiga, antidiuretic Rx and titrated Metoprolol, experincing dizziness. Plan to wear a heart monitor and have EKG on Friday, bowel movements, bladder and appetite are normal, denies abnormal vaginal bleeding or discharge     They deny fever, chills, chest pain, SOB, nausea, vomiting, diarrhea, constipation, dysuria, or any other concerning signs of symptoms.      Past medical history:  Hypertension  Osteoporosis  Osteoarthritis  Mild renal insufficiency (GFR 57 in 2020)     Surgical history:   ×1  Removal of parathyroid gland , benign  Abdominoplasty   Shoulder and elbow surgeries.     OB/GYN history:  Hormone replacement therapy beginning in  and continuing to the present time.  Presently on estradiol 1 mg daily, takes 200 mg of progesterone days 1-12 each month.     No recalled history of abnormal Pap smears.     Family history:  No family history of cancer.  She is an only child.    Objective    BSA: 1.75 meters squared  /73 (BP Location: Left arm, Patient Position: Sitting, BP Cuff Size: Adult)   Pulse 68   Temp 36.6 °C (97.9 °F) (Temporal)   Resp 17   Wt 68.2 kg (150 lb 3.9 oz)   SpO2 96%   BMI 25.79 kg/m²      Physical Exam  Constitutional:       General: She is not in acute distress.     Appearance: Normal appearance. She is normal weight. She is not toxic-appearing.   HENT:      Head: Normocephalic.      Mouth/Throat:      Mouth: Mucous membranes are moist.      Pharynx: Oropharynx is clear.   Eyes:      Extraocular Movements: Extraocular movements intact.      Conjunctiva/sclera: Conjunctivae normal.      Pupils: Pupils are equal, round, and reactive to light.   Cardiovascular:      Rate and  Rhythm: Normal rate and regular rhythm.      Heart sounds: Normal heart sounds. No murmur heard.     No friction rub. No gallop.   Pulmonary:      Effort: Pulmonary effort is normal.      Breath sounds: Normal breath sounds. No wheezing or rhonchi.   Abdominal:      General: Abdomen is flat. Bowel sounds are normal. There is no distension.      Palpations: Abdomen is soft.      Tenderness: There is no abdominal tenderness.   Genitourinary:     Comments: Normal external female genitalia without lesions or masses  Speculum exam: Smooth vagina without lesions or masses, radiation changes noted in the vagina.  Bimanual exam: smooth vagina without lesions or masses, surgically absent uterus, cervix, adnexa    Musculoskeletal:         General: No swelling. Normal range of motion.      Cervical back: Normal range of motion.   Skin:     General: Skin is warm and dry.   Neurological:      General: No focal deficit present.      Mental Status: She is alert and oriented to person, place, and time.   Psychiatric:         Mood and Affect: Mood normal.         Behavior: Behavior normal.         Performance Status:  Asymptomatic    Assessment/Plan     Oncology History Overview Note   Tumor History:   12/20: [facility] BSO, sentinel lymph nodes, Stage IA grade 3, serous, endometrial cancer. 9% invasion, Lymphatic vessel invasion present, MMR intact, Her2 expression negative  1/2021-2/2021: Chemo started, HSR to taxolx2 with change to abraxane     Endometrial carcinoma (Multi)   11/3/2023 Initial Diagnosis    Endometrial carcinoma (CMS/HCC)       84 y.o.  female with history of Stage IA grade 3, serous endometrial cancer s/p TLH/BSO, SLN excision on 12/14/20 followed by adjuvant therapy with  2 cycles carboplatin and taxol and 1 cycle carboplatin and abraxane completed 2/23/21. She completed vaginal brachytherapy at [facility] on 4/8/21.      # Endometrial cancer  - Physical examination was within normal limits today, no evidence of recurrence    - We reviewed signs and symptoms of possible recurrence with the patient and she will call our office should she experience any of these  - Follow up in 6 months       Marjan Mercedes MD       Scribe Attestation  By signing my name below, I, Mike George   attest that this documentation has been prepared under the direction and in the presence of Marjan Mercedes MD.     Provider Attestation - Scribe documentation    All medical record entries made by the Scribe were at my direction and personally dictated by me. I have reviewed the chart and agree that the record accurately reflects my personal performance of the history, physical exam, discussion and plan.    Marjan Mercedes MD

## 2024-10-08 NOTE — PROGRESS NOTES
History Of Present Illness  Chel Lowe is a 84 y.o. female presenting with  low back pain radiating to the posterior aspect of the left lower extremity.  Patient describes it as sharp shooting in nature not associated with any numbness or weakness.  Patient was started on conservative treatment consisting of Norco 5/325 3 times daily that provided her significant relief.  The patient has no new complaints and she is coming for refill of her medications and a drug screen.    She is having a cardiac workup for her dry arrhythmia and free put on Holter monitor for 24 hours.  Patient also has past medical history of congestive heart failure for which she was started on Farxiga 5 mg once daily.     Past Medical History  She has a past medical history of Endometrial cancer (Multi).    Surgical History  She has a past surgical history that includes  section, classic (2013); Shoulder surgery (2013); and Hysterectomy.     Social History  She reports that she has never smoked. She has never used smokeless tobacco. She reports that she does not drink alcohol and does not use drugs.    Family History  No family history on file.     Allergies  Patient has no known allergies.    Review of systems:   13-point review of systems done and negative except as noted in HPI      Physical Exam    MSK:   No asymmetry or masses noted of the musculature.   Examination of the muscles/joints/bones show normal range of motion.  The patient is mildly tender to palpation in the cervical and lumbar paraspinal musculature as well as the medial lateral joint lines of both knees.    Neurologic:  Motor strength:   5/5 muscle strength of the upper extremities bilateral and equal.  5/5 muscle strength of the lower extremities bilaterally and equal.     Sensation:   Sensation intact to pin prick in the bilateral upper extremities.  Sensation intact to pin prick in the bilateral lower extremities.     Provocative tests: Negative  Lazarus sign bilaterally, 2+ patellar reflexes bilaterally.    Psychiatric: Mood and affect are normal.     Last Recorded Vitals  /75   Pulse 77   Resp 18   SpO2 96%     Relevant Results            Last OARRS Review: No data recorded    I have personally reviewed the OARRS report for Chel Lowe I have considered the risks of abuse, dependence, addiction and diversion  Overdose risk score is 40.  Morphine milligram equivalent is 12 mg/day     Assessment/Plan   Diagnoses and all orders for this visit:  Lumbar radiculitis  Spinal stenosis of lumbar region with neurogenic claudication      Plan  Renew current pain medications       Chang Murray MD

## 2024-10-09 ENCOUNTER — APPOINTMENT (OUTPATIENT)
Dept: PHARMACY | Facility: HOSPITAL | Age: 84
End: 2024-10-09
Payer: MEDICARE

## 2024-10-09 DIAGNOSIS — I48.0 PAROXYSMAL ATRIAL FIBRILLATION (MULTI): ICD-10-CM

## 2024-10-09 NOTE — PROGRESS NOTES
Clinical Pharmacy Appointment    Patient ID: Chel Lowe is a 84 y.o. female who presents for Follow Up appointment.     Referring Provider: Sis Hamilton MD  PCP: Edward Rogers MD     Subjective     CONGESTIVE HEART FAILURE ASSESSMENT  Does patient follow with Cardiology: Yes    Date: 8/27/24    Staging  HFpEF  Ejection Fraction: 65-70% (9/9/24)     Symptom Assessment  Weight changes/edema?: No  Dyspnea?: None  Dizziness/syncope/palpitations?: No    Medication Therapy  Current Regimen (GDMT):  ARNI/ACEi/ARB: No  Beta Blocker: Yes - metoprolol succinate 200 mg daily   MRA: Yes - Spironolactone 50 mg daily   SGLT2i: Yes - Farxiga 5 mg daily, to increase to 10 mg daily    Other therapy:  Furosemide 20 mg daily     Adverse Effects: None      Secondary Prevention  The ASCVD Risk score (Lidia HO, et al., 2019) failed to calculate for the following reasons:    The 2019 ASCVD risk score is only valid for ages 40 to 79  Aspirin 81mg? no  Statin?: No  HTN?: Yes - controlled      Medication Reconciliation:  No changes    Drug Interactions  No relevant drug interactions were noted.    Medication System Management  Patient's preferred pharmacy: [facility]   Adherence/Organization: No issues - takes as directed and reports no missed doses  Affordability/Accessibility: Farxiga   PAP: Yes  Expiration: 9/4/2025    Objective   No Known Allergies  Social History     Social History Narrative    Not on file      Medication Review  Current Outpatient Medications   Medication Instructions    calcium carbonate-vitamin D3 600 mg-5 mcg (200 unit) tablet 1 tablet, oral, 2 times daily    dapagliflozin propanediol (FARXIGA) 5 mg, oral, Daily    Farxiga 10 mg, oral, Daily    furosemide (LASIX) 20 mg, oral, Daily    glucosamine-chondroit-vit C-Mn 500-400 mg capsule 1 capsule, oral, 2 times daily    [START ON 12/3/2024] HYDROcodone-acetaminophen (Norco) 5-325 mg tablet 1 tablet, oral, 3 times daily PRN    [START ON 11/5/2024]  "HYDROcodone-acetaminophen (Norco) 5-325 mg tablet 1 tablet, oral, 3 times daily PRN    HYDROcodone-acetaminophen (Norco) 5-325 mg tablet 1 tablet, oral, 3 times daily PRN    magnesium gluconate (Magonate) 27.5 mg magne- sium (500 mg) tablet 55 mg, oral, 2 times daily    methocarbamol (ROBAXIN) 750 mg, oral, 3 times daily PRN    metoprolol succinate XL (TOPROL XL) 200 mg, oral, Daily, Do not crush or chew.    metoprolol tartrate (Lopressor) 50 mg tablet Take 1.5 tablets twice daily    multivitamin tablet oral    naloxone (Narcan) 4 mg/0.1 mL nasal spray nasal, Used as directed    naloxone (NARCAN) 4 mg, nasal, As needed, May repeat every 2-3 minutes if needed, alternating nostrils, until medical assistance becomes available.    pantoprazole (PROTONIX) 40 mg, oral, Daily    risedronate (Actonel) 35 mg tablet TAKE 1 TABLET BY MOUTH ONCE WEEKLY- TAKE WITH A FULL GLASS OF WATER ON AN EMPTY STOMACH. DO NOT LIE DOWN, EAT OR DRINK FOR 30 MINUTES.    risedronate (Actonel) 35 mg tablet Take 1 tablet by mouth once a week in morning with full glass of water on an empty stomach. No food, drink, meds, or lying down for 30 minutes after.    rivaroxaban (Xarelto) 20 mg tablet TAKE 1 TABLET BY MOUTH ONE TIME DAILY    spironolactone (ALDACTONE) 50 mg, oral, Daily      Vitals  BP Readings from Last 2 Encounters:   10/08/24 124/73   10/08/24 129/75     BMI Readings from Last 1 Encounters:   10/08/24 25.79 kg/m²      Labs  A1C  No results found for: \"HGBA1C\"  BMP  Lab Results   Component Value Date    CALCIUM 10.0 08/26/2024     08/26/2024    K 4.3 08/26/2024    CO2 25 08/26/2024     08/26/2024    BUN 12 08/26/2024    CREATININE 0.91 08/26/2024    EGFR 62 08/26/2024     LFTs  Lab Results   Component Value Date    ALT 22 07/01/2024    AST 22 07/01/2024    ALKPHOS 37 07/01/2024    BILITOT 1.2 07/01/2024     FLP  Lab Results   Component Value Date    TRIG 124 12/30/2023    CHOL 165 12/30/2023    LDLF 89 12/13/2022    LDLCALC 92 " "12/30/2023    HDL 48.4 12/30/2023     Urine Microalbumin  No results found for: \"MICROALBCREA\"  Weight Management  Wt Readings from Last 3 Encounters:   10/08/24 68.2 kg (150 lb 3.9 oz)   04/23/24 70.7 kg (155 lb 12.1 oz)   03/06/24 69.4 kg (153 lb)      There is no height or weight on file to calculate BMI.     Assessment/Plan   Problem List Items Addressed This Visit       Paroxysmal atrial fibrillation (Multi)     Patient has HFpEF with most recent EF 55-60% on 1/8/21. Of note, patient has an upcoming Echo on Monday 9/9/24.      Rationale for plan: Patient reports full tolerability with Farxiga. At this time, patient denies signs or symptoms of UTI or yeast infections. No adverse events reported. Patient is enrolled in  PAP for Farxiga although is requesting switch to AZ&Me for Farxiga coverage. Working with cardiologist for enrollment. At this time, advised patient to continue current medication regimen and continue monitoring of weight, BP and HR. Will continue to follow.    Medication Changes: None   CONTINUE:  Farxiga 10 mg daily  Metoprolol succinate 200 mg daily   Spironolactone 50 mg daily   Furosemide 20 mg daily    Monitoring and Education:  Weigh yourself without clothing daily after using the bathroom first thing in the morning before breakfast   Contact your physician/seek help immediately if you notice the following with symptoms of shortness of breath or swelling in your extremities:   Weight gain of 3+ lbs overnight   Weight gain of 5+ lbs in a week   Physical limitations to your normal physical activity level   Limit fluid intake as instructed by your doctor and follow a heart friendly diet low in salt, fat, and focused in lean meats   Aim to exercise for 30 minutes anywhere between 3 to 5 times a week or more, depending on your physical limitations   Keep a log of your daily BP, HR and weight to share with providers   If you are a smoker or drink alcohol, consider cessation to improve your " [facility]     Clinical Pharmacist follow-up: 1 month, Telehealth visit    Continue all meds under the continuation of care with the referring provider and clinical pharmacy team.    Thank you,  Rocío Temple, PharmD  Clinical Pharmacist  [phone]    Verbal consent to manage patient's drug therapy was obtained from the patient. They were informed they may decline to participate or withdraw from participation in pharmacy services at any time.

## 2024-10-10 RX ORDER — DAPAGLIFLOZIN 10 MG/1
10 TABLET, FILM COATED ORAL DAILY
Qty: 90 TABLET | Refills: 3 | Status: SHIPPED | OUTPATIENT
Start: 2024-10-10

## 2024-10-11 ENCOUNTER — HOSPITAL ENCOUNTER (OUTPATIENT)
Dept: CARDIOLOGY | Facility: HOSPITAL | Age: 84
Discharge: HOME | End: 2024-10-11
Payer: MEDICARE

## 2024-10-11 DIAGNOSIS — I10 BENIGN HYPERTENSION: ICD-10-CM

## 2024-10-11 DIAGNOSIS — I48.0 PAROXYSMAL ATRIAL FIBRILLATION (MULTI): ICD-10-CM

## 2024-10-11 DIAGNOSIS — R94.31 ABNORMAL EKG: ICD-10-CM

## 2024-10-11 LAB
ATRIAL RATE: 71 BPM
P AXIS: 81 DEGREES
P OFFSET: 210 MS
P ONSET: 150 MS
PR INTERVAL: 156 MS
Q ONSET: 228 MS
QRS COUNT: 11 BEATS
QRS DURATION: 70 MS
QT INTERVAL: 384 MS
QTC CALCULATION(BAZETT): 417 MS
QTC FREDERICIA: 406 MS
R AXIS: 43 DEGREES
T AXIS: 0 DEGREES
T OFFSET: 420 MS
VENTRICULAR RATE: 71 BPM

## 2024-10-11 PROCEDURE — 93005 ELECTROCARDIOGRAM TRACING: CPT

## 2024-10-11 PROCEDURE — 93246 EXT ECG>7D<15D RECORDING: CPT

## 2024-10-24 ENCOUNTER — APPOINTMENT (OUTPATIENT)
Dept: PHARMACY | Facility: HOSPITAL | Age: 84
End: 2024-10-24
Payer: MEDICARE

## 2024-11-07 DIAGNOSIS — I48.91 ATRIAL FIBRILLATION, UNSPECIFIED TYPE (MULTI): ICD-10-CM

## 2024-11-07 PROCEDURE — RXMED WILLOW AMBULATORY MEDICATION CHARGE

## 2024-11-07 RX ORDER — METOPROLOL TARTRATE 50 MG/1
50 TABLET ORAL 2 TIMES DAILY
Qty: 180 TABLET | Refills: 3 | Status: SHIPPED | OUTPATIENT
Start: 2024-11-07 | End: 2025-11-07

## 2024-11-08 ENCOUNTER — PHARMACY VISIT (OUTPATIENT)
Dept: PHARMACY | Facility: CLINIC | Age: 84
End: 2024-11-08
Payer: COMMERCIAL

## 2024-11-18 ENCOUNTER — LAB REQUISITION (OUTPATIENT)
Dept: LAB | Facility: HOSPITAL | Age: 84
End: 2024-11-18
Payer: MEDICARE

## 2024-11-18 DIAGNOSIS — I10 ESSENTIAL (PRIMARY) HYPERTENSION: ICD-10-CM

## 2024-11-18 DIAGNOSIS — I50.9 HEART FAILURE, UNSPECIFIED: ICD-10-CM

## 2024-11-18 LAB
ANION GAP SERPL CALC-SCNC: 12 MMOL/L (ref 10–20)
BASOPHILS # BLD AUTO: 0.02 X10*3/UL (ref 0–0.1)
BASOPHILS NFR BLD AUTO: 0.2 %
BNP SERPL-MCNC: 200 PG/ML (ref 0–99)
BUN SERPL-MCNC: 16 MG/DL (ref 6–23)
CALCIUM SERPL-MCNC: 9.9 MG/DL (ref 8.6–10.3)
CHLORIDE SERPL-SCNC: 105 MMOL/L (ref 98–107)
CO2 SERPL-SCNC: 24 MMOL/L (ref 21–32)
CREAT SERPL-MCNC: 1 MG/DL (ref 0.5–1.05)
EGFRCR SERPLBLD CKD-EPI 2021: 56 ML/MIN/1.73M*2
EOSINOPHIL # BLD AUTO: 0.16 X10*3/UL (ref 0–0.4)
EOSINOPHIL NFR BLD AUTO: 1.8 %
ERYTHROCYTE [DISTWIDTH] IN BLOOD BY AUTOMATED COUNT: 12.4 % (ref 11.5–14.5)
GLUCOSE SERPL-MCNC: 174 MG/DL (ref 74–99)
HCT VFR BLD AUTO: 46.9 % (ref 36–46)
HGB BLD-MCNC: 15.6 G/DL (ref 12–16)
IMM GRANULOCYTES # BLD AUTO: 0.03 X10*3/UL (ref 0–0.5)
IMM GRANULOCYTES NFR BLD AUTO: 0.3 % (ref 0–0.9)
LYMPHOCYTES # BLD AUTO: 2.16 X10*3/UL (ref 0.8–3)
LYMPHOCYTES NFR BLD AUTO: 25 %
MCH RBC QN AUTO: 32.1 PG (ref 26–34)
MCHC RBC AUTO-ENTMCNC: 33.3 G/DL (ref 32–36)
MCV RBC AUTO: 97 FL (ref 80–100)
MONOCYTES # BLD AUTO: 0.56 X10*3/UL (ref 0.05–0.8)
MONOCYTES NFR BLD AUTO: 6.5 %
NEUTROPHILS # BLD AUTO: 5.72 X10*3/UL (ref 1.6–5.5)
NEUTROPHILS NFR BLD AUTO: 66.2 %
NRBC BLD-RTO: 0 /100 WBCS (ref 0–0)
PLATELET # BLD AUTO: 218 X10*3/UL (ref 150–450)
POTASSIUM SERPL-SCNC: 4.1 MMOL/L (ref 3.5–5.3)
RBC # BLD AUTO: 4.86 X10*6/UL (ref 4–5.2)
SODIUM SERPL-SCNC: 137 MMOL/L (ref 136–145)
WBC # BLD AUTO: 8.7 X10*3/UL (ref 4.4–11.3)

## 2024-11-18 PROCEDURE — 80048 BASIC METABOLIC PNL TOTAL CA: CPT

## 2024-11-18 PROCEDURE — 83880 ASSAY OF NATRIURETIC PEPTIDE: CPT

## 2024-11-18 PROCEDURE — 85025 COMPLETE CBC W/AUTO DIFF WBC: CPT

## 2024-11-20 PROCEDURE — RXMED WILLOW AMBULATORY MEDICATION CHARGE

## 2024-11-25 ENCOUNTER — PHARMACY VISIT (OUTPATIENT)
Dept: PHARMACY | Facility: CLINIC | Age: 84
End: 2024-11-25
Payer: COMMERCIAL

## 2024-12-01 VITALS — BODY MASS INDEX: 25.75 KG/M2 | WEIGHT: 150 LBS

## 2024-12-02 ENCOUNTER — APPOINTMENT (OUTPATIENT)
Dept: CARDIOLOGY | Facility: CLINIC | Age: 84
End: 2024-12-02
Payer: MEDICARE

## 2024-12-02 DIAGNOSIS — I10 BENIGN HYPERTENSION: Primary | ICD-10-CM

## 2024-12-02 DIAGNOSIS — E78.00 PURE HYPERCHOLESTEROLEMIA: ICD-10-CM

## 2024-12-02 DIAGNOSIS — I48.0 PAROXYSMAL ATRIAL FIBRILLATION (MULTI): ICD-10-CM

## 2024-12-03 NOTE — PROGRESS NOTES
Primary Care Physician: Edward Rogers MD        Date of Visit: 12/02/2024  3:00 PM EST  Location of visit:  [facility]   Type of Visit: Follow up     This is a virtual visit.  A secure audio communication was established with the patient.    Dear Dr Rogers,  DIAGNOSES: PAF. SVT. HTN. Preserved LV systolic function. HFpEF. NYHA Class II.        Chief Complaint   Patient presents with    Follow-up     Follow up to go over zio results       HPI / Summary:   Chel Lowe is a 84 y.o. female  who returns for routine follow up.  She feels well and remains reasonably active, taking care of her  as well.      12 system review is negative except as noted above  Accompanied by her   who contributed to the history       Medical History:   Past Medical History:   Diagnosis Date    Endometrial cancer (Multi)        Social History:   Tobacco Use: Low Risk  (12/1/2024)    Patient History     Smoking Tobacco Use: Never     Smokeless Tobacco Use: Never     Passive Exposure: Not on file         MEDICATIONS:   Current Outpatient Medications   Medication Instructions    calcium carbonate-vitamin D3 600 mg-5 mcg (200 unit) tablet 1 tablet, 2 times daily    dapagliflozin propanediol (FARXIGA) 10 mg, oral, Daily    furosemide (LASIX) 20 mg, oral, Daily    glucosamine-chondroit-vit C-Mn 500-400 mg capsule 1 capsule, 2 times daily    [START ON 12/3/2024] HYDROcodone-acetaminophen (Norco) 5-325 mg tablet 1 tablet, oral, 3 times daily PRN    HYDROcodone-acetaminophen (Norco) 5-325 mg tablet 1 tablet, oral, 3 times daily PRN    HYDROcodone-acetaminophen (Norco) 5-325 mg tablet 1 tablet, oral, 3 times daily PRN    magnesium gluconate (Magonate) 27.5 mg magne- sium (500 mg) tablet 55 mg, 2 times daily    methocarbamol (ROBAXIN) 750 mg, oral, 3 times daily PRN    metoprolol succinate XL (TOPROL XL) 200 mg, oral, Daily, Do not crush or chew.    metoprolol tartrate (Lopressor) 50 mg tablet Take 1.5 tablets twice daily     metoprolol tartrate (Lopressor) 50 mg tablet TAKE 1 TABLET BY MOUTH TWO TIMES A DAY    multivitamin tablet Take by mouth.    naloxone (Narcan) 4 mg/0.1 mL nasal spray Administer into affected nostril(s). Used as directed    naloxone (NARCAN) 4 mg, nasal, As needed, May repeat every 2-3 minutes if needed, alternating nostrils, until medical assistance becomes available.    pantoprazole (PROTONIX) 40 mg, oral, Daily    risedronate (Actonel) 35 mg tablet TAKE 1 TABLET BY MOUTH ONCE WEEKLY- TAKE WITH A FULL GLASS OF WATER ON AN EMPTY STOMACH. DO NOT LIE DOWN, EAT OR DRINK FOR 30 MINUTES.    risedronate (Actonel) 35 mg tablet Take 1 tablet by mouth once a week in morning with full glass of water on an empty stomach. No food, drink, meds, or lying down for 30 minutes after.    rivaroxaban (Xarelto) 20 mg tablet TAKE 1 TABLET BY MOUTH ONE TIME DAILY    spironolactone (ALDACTONE) 50 mg, oral, Daily           LABS:    CBC with Differential:    Lab Results   Component Value Date    WBC 8.7 11/18/2024    RBC 4.86 11/18/2024    HGB 15.6 11/18/2024    HCT 46.9 (H) 11/18/2024     11/18/2024    MCV 97 11/18/2024    MCH 32.1 11/18/2024    MCHC 33.3 11/18/2024    RDW 12.4 11/18/2024    NRBC 0.0 11/18/2024    LYMPHOPCT 25.0 11/18/2024    MONOPCT 6.5 11/18/2024    EOSPCT 1.8 11/18/2024    BASOPCT 0.2 11/18/2024    MONOSABS 0.56 11/18/2024    LYMPHSABS 2.16 11/18/2024    EOSABS 0.16 11/18/2024    BASOSABS 0.02 11/18/2024     CMP:    Lab Results   Component Value Date     11/18/2024    K 4.1 11/18/2024     11/18/2024    CO2 24 11/18/2024    BUN 16 11/18/2024    CREATININE 1.00 11/18/2024    GLUCOSE 174 (H) 11/18/2024    PROT 6.4 07/01/2024    CALCIUM 9.9 11/18/2024    BILITOT 1.2 07/01/2024    ALKPHOS 37 07/01/2024    AST 22 07/01/2024    ALT 22 07/01/2024     BMP:    Lab Results   Component Value Date     11/18/2024    K 4.1 11/18/2024     11/18/2024    CO2 24 11/18/2024    BUN 16 11/18/2024    CREATININE  1.00 11/18/2024    CALCIUM 9.9 11/18/2024    GLUCOSE 174 (H) 11/18/2024     Magnesium:  Lab Results   Component Value Date    MG 2.07 04/27/2021     Troponin:    Lab Results   Component Value Date    TROPHS 15 (H) 06/29/2022    TROPHS 16 (H) 06/29/2022     BNP:   Lab Results   Component Value Date     (H) 11/18/2024         Lipid Panel:  Lab Results   Component Value Date    HDL 48.4 12/30/2023    CHHDL 3.4 12/30/2023    VLDL 25 12/30/2023    TRIG 124 12/30/2023    NHDL 117 12/30/2023        Lab work and imaging results independently reviewed by me     Visit Vitals  Wt 68 kg (150 lb)   BMI 25.75 kg/m²   OB Status Postmenopausal   Smoking Status Never   BSA 1.75 m²               DIAGNOSES: PAF. SVT. HTN. Preserved LV systolic function. HFpEF. NYHA Class II.     I am pleased to see that normal has been feeling well from a cardiac perspective.  Advised her to continue with medications as prescribed and regular follow-up with you.    Thank you so much for the opportunity to participate in the care of this very pleasant lady. Please do not hesitate to contact me if I could be of any assistance in her future care.    Sincerely        Angeli Hamilton M.D.              12/02/24 at 8:21 PM - Sis Hamilton MD      Orders:  No orders of the defined types were placed in this encounter.        Followup Appts:  Future Appointments   Date Time Provider Department Center   12/10/2024  1:30 PM MD Francheska [facility]   3/3/2025  1:00 PM Sis Hamilton MD [id number] [facility]   3/11/2025  1:00 PM JEREMÍAS Bradshaw-CNP [facility]   3/11/2025  2:00 PM Marjan Mercedes MD [facility]

## 2024-12-10 ENCOUNTER — OFFICE VISIT (OUTPATIENT)
Dept: PAIN MEDICINE | Facility: CLINIC | Age: 84
End: 2024-12-10
Payer: MEDICARE

## 2024-12-10 VITALS
DIASTOLIC BLOOD PRESSURE: 82 MMHG | HEART RATE: 80 BPM | OXYGEN SATURATION: 97 % | SYSTOLIC BLOOD PRESSURE: 144 MMHG | RESPIRATION RATE: 18 BRPM

## 2024-12-10 DIAGNOSIS — M48.062 SPINAL STENOSIS OF LUMBAR REGION WITH NEUROGENIC CLAUDICATION: ICD-10-CM

## 2024-12-10 DIAGNOSIS — M54.16 LUMBAR RADICULITIS: ICD-10-CM

## 2024-12-10 PROCEDURE — 3077F SYST BP >= 140 MM HG: CPT | Performed by: ANESTHESIOLOGY

## 2024-12-10 PROCEDURE — 99213 OFFICE O/P EST LOW 20 MIN: CPT | Performed by: ANESTHESIOLOGY

## 2024-12-10 PROCEDURE — 3079F DIAST BP 80-89 MM HG: CPT | Performed by: ANESTHESIOLOGY

## 2024-12-10 ASSESSMENT — PAIN - FUNCTIONAL ASSESSMENT: PAIN_FUNCTIONAL_ASSESSMENT: 0-10

## 2024-12-10 ASSESSMENT — PAIN DESCRIPTION - DESCRIPTORS: DESCRIPTORS: ACHING

## 2024-12-10 ASSESSMENT — PAIN SCALES - GENERAL: PAINLEVEL_OUTOF10: 0 - NO PAIN

## 2024-12-10 NOTE — PROGRESS NOTES
Pain Management Clinic Note   History Of Present Illness  Chel Lowe is a 84 y.o. female presenting with  low back pain radiating to the posterior aspect of the left lower extremity.  Patient describes it as sharp shooting in nature not associated with any numbness or weakness.  Patient was started on conservative treatment consisting of Norco 5/325 TID that provided her significant relief.    The patient has no new complaints and she is coming for refill of her medications and a drug screen.       Past Medical History  She has a past medical history of Endometrial cancer (Multi).    Surgical History  She has a past surgical history that includes  section, classic (2013); Shoulder surgery (2013); and Hysterectomy.     Social History  She reports that she has never smoked. She has never used smokeless tobacco. She reports that she does not drink alcohol and does not use drugs.    Family History  No family history on file.     Allergies  Patient has no known allergies.    Review of Symptoms:   Constitutional: Negative for chills, diaphoresis or fever  HENT: Negative for neck swelling  Eyes:.  Negative for eye pain  Respiratory:.  Negative for cough, shortness of breath or wheezing    Cardiovascular:.  Negative for chest pain or palpitations  Gastrointestinal:.  Negative for abdominal pain, nausea and vomiting  Genitourinary:.  Negative for urgency  Musculoskeletal:  Positive for back pain. Positive for joint pain. Denies falls within the past 3 months.  Skin: Negative for wounds or itching   Neurological: Negative for dizziness, seizures, loss of consciousness and weakness  Endo/Heme/Allergies: Does not bruise/bleed easily  Psychiatric/Behavioral: Negative for depression. The patient does not appear anxious.       Physical Exam     Advanced Exam   Inspection: No gross deformities, no surgical scars  Palpation: No tenderness of patient of lumbar midline, lumbar paraspinals, bilateral SI  joints  ROM: Normal range of motion of the lumbar flexion extension  Motor: 5/5 strength upper and lower extremities  Sensory: Negative for sensory abnormalities in upper and lower extremities  Reflexes: 2+ reflexes bilateral upper and lower extremities  Lumbar: Negative straight leg raising bilaterally, negative for facet loading, mildly tender to palpation in the cervical and lumbar paraspinal musculature      Last Recorded Vitals  /82   Pulse 80   Resp 18   SpO2 97%     Relevant Results  Current Outpatient Medications   Medication Instructions    calcium carbonate-vitamin D3 600 mg-5 mcg (200 unit) tablet 1 tablet, 2 times daily    dapagliflozin propanediol (FARXIGA) 10 mg, oral, Daily    furosemide (LASIX) 20 mg, oral, Daily    glucosamine-chondroit-vit C-Mn 500-400 mg capsule 1 capsule, 2 times daily    HYDROcodone-acetaminophen (Norco) 5-325 mg tablet 1 tablet, oral, 3 times daily PRN    HYDROcodone-acetaminophen (Norco) 5-325 mg tablet 1 tablet, oral, 3 times daily PRN    HYDROcodone-acetaminophen (Norco) 5-325 mg tablet 1 tablet, oral, 3 times daily PRN    magnesium gluconate (Magonate) 27.5 mg magne- sium (500 mg) tablet 55 mg, 2 times daily    methocarbamol (ROBAXIN) 750 mg, oral, 3 times daily PRN    metoprolol succinate XL (TOPROL XL) 200 mg, oral, Daily, Do not crush or chew.    metoprolol tartrate (Lopressor) 50 mg tablet Take 1.5 tablets twice daily    metoprolol tartrate (Lopressor) 50 mg tablet TAKE 1 TABLET BY MOUTH TWO TIMES A DAY    multivitamin tablet Take by mouth.    naloxone (Narcan) 4 mg/0.1 mL nasal spray Administer into affected nostril(s). Used as directed    naloxone (NARCAN) 4 mg, nasal, As needed, May repeat every 2-3 minutes if needed, alternating nostrils, until medical assistance becomes available.    pantoprazole (PROTONIX) 40 mg, oral, Daily    risedronate (Actonel) 35 mg tablet TAKE 1 TABLET BY MOUTH ONCE WEEKLY- TAKE WITH A FULL GLASS OF WATER ON AN EMPTY STOMACH. DO  NOT LIE DOWN, EAT OR DRINK FOR 30 MINUTES.    risedronate (Actonel) 35 mg tablet Take 1 tablet by mouth once a week in morning with full glass of water on an empty stomach. No food, drink, meds, or lying down for 30 minutes after.    rivaroxaban (Xarelto) 20 mg tablet TAKE 1 TABLET BY MOUTH ONE TIME DAILY    spironolactone (ALDACTONE) 50 mg, oral, Daily         MR lumbar spine wo IV contrast     Narrative  * * *Final Report* * *    DATE OF EXAM: Apr 23 2024  5:42PM    [facility]   0303  -  MRI LUMBAR SPINE WO IVCON  / ACCESSION #  [id number]    PROCEDURE REASON: low back pain, sciatica    * * * * Physician Interpretation * * * *    EXAMINATION:  MRI LUMBAR SPINE WO IVCON    CLINICAL HISTORY:  low back pain, sciatica      TECHNIQUE: Routine lumbosacral spine MR protocol without gadolinium.  MQ:  [id number]    COMPARISON: None.      RESULT:      Counting reference:  Lumbosacral junction.  For the purposes of this  report,  L4-5 is considered the level of the iliac crest and assume there  are 5 lumbar-type vertebrae.  Anatomic variant:  None.    Localizer images:  No additional findings.    Alignment:    Levocurvature, apex L3.  Grade 1 anterolisthesis of L2 on  L3.    Bone marrow signal/fracture:  No evidence of pathologic marrow  infiltration.  No evidence of prior fracture.    Conus:  The conus is within normal limits of signal intensity and  morphology.    Paraspinal soft tissues:   Paraspinal soft tissues are within normal  limits.    Lower thoracic spine:  Visualized lower thoracic canal and foramina are  patent.  Small perineural cysts at the left and right lower thoracic  foramen.    L1-L2:    Moderate spinal canal stenosis secondary to disc bulging and  endplate osteophytic spurring as well as hypertrophic degenerative facet  arthrosis and ligamentum flavum thickening.  Foramina are patent.    L2-L3:    Severe spinal canal stenosis secondary to disc bulging/disc  uncovering, endplate osteophytic changes and  hypertrophic degenerative  facet arthrosis with ligamentum flavum thickening.  Moderate to severe  right and moderate left subarticular recess effacement.  Mild left and no  significant right foraminal stenosis.    L3-L4:    Mild to moderate spinal canal stenosis secondary to disc  bulging and endplate osteophytic spurring as well as right greater than  left degenerative facet arthrosis.  Mild right subarticular recess  effacement.  Mild right and no significant left foraminal stenosis.    L4-L5:    Mild spinal canal stenosis secondary to broad-based disc  bulging and endplate osteophytic spurring.  Extension of disc bulging  into both inferior foramen contributing to mild bilateral foraminal  stenosis.  Mild left subarticular recess effacement.    L5-S1:    Spinal canal is patent.  Shallow disc bulging slightly flattens  the ventral thecal sac contacting the traversing S1 nerve roots.  Moderate degenerative facet arthrosis.  Mild left greater than right  foraminal stenosis.    Sacrum and iliac wings:   Mild degenerative changes of the image  sacroiliac joints.    Impression  IMPRESSION:    Lumbar spondylosis with multilevel canal narrowing, up to severe in  degree at L2-L3.  Subarticular recess effacement is most pronounced at  this level.    Varying degrees of mild foraminal stenoses.    Anatomic Lumbar Variant: None.  L4-5 is considered the level of the iliac  crest and assume there are 5 lumbar-type vertebrae.    [facility]  Transcribe Date/Time: Apr 24 2024  7:40A    Dictated by : ASHLY CAMPOS DO    This examination was interpreted and the report reviewed and  electronically signed by:  ASHLY CAMPOS DO on Apr 24 2024  7:46AM  EST     No image results found.       1. Lumbar radiculitis        2. Spinal stenosis of lumbar region with neurogenic claudication             ASSESSMENT/PLAN  Chel Lowe is a 84 y.o. female with presenting with Lumbar radiculitis due to Spinal stenosis of lumbar  region with neurogenic claudication. Patient receiving significant relief from current medication and OARRS report reviewed. Overdose risk score is 40.  Morphine milligram equivalent is 11 mg/day.    Our plan is as follows:  - Renew current pain medications   - Continue pain medications as prescribed       Hay Siddiqui MD

## 2024-12-11 RX ORDER — HYDROCODONE BITARTRATE AND ACETAMINOPHEN 5; 325 MG/1; MG/1
1 TABLET ORAL 3 TIMES DAILY PRN
Qty: 84 TABLET | Refills: 0 | Status: SHIPPED | OUTPATIENT
Start: 2025-01-07 | End: 2025-02-04

## 2024-12-11 RX ORDER — HYDROCODONE BITARTRATE AND ACETAMINOPHEN 5; 325 MG/1; MG/1
1 TABLET ORAL 3 TIMES DAILY PRN
Qty: 84 TABLET | Refills: 0 | Status: SHIPPED | OUTPATIENT
Start: 2025-03-04

## 2024-12-11 RX ORDER — HYDROCODONE BITARTRATE AND ACETAMINOPHEN 5; 325 MG/1; MG/1
1 TABLET ORAL 3 TIMES DAILY PRN
Qty: 84 TABLET | Refills: 0 | Status: SHIPPED | OUTPATIENT
Start: 2025-02-04

## 2024-12-13 ENCOUNTER — TELEPHONE (OUTPATIENT)
Dept: ENDOCRINOLOGY | Facility: CLINIC | Age: 84
End: 2024-12-13
Payer: MEDICARE

## 2024-12-13 NOTE — TELEPHONE ENCOUNTER
Chel called and is asking if you could put in a bone scan for her.   Reminded pt she has not been seen since 2/20/23 and pt stated they have had a lot going on and would appreciate the consideration     [place] # [phone]

## 2024-12-17 ENCOUNTER — APPOINTMENT (OUTPATIENT)
Dept: PAIN MEDICINE | Facility: CLINIC | Age: 84
End: 2024-12-17
Payer: MEDICARE

## 2024-12-18 ENCOUNTER — TELEPHONE (OUTPATIENT)
Dept: ENDOCRINOLOGY | Facility: CLINIC | Age: 84
End: 2024-12-18
Payer: MEDICARE

## 2024-12-18 NOTE — TELEPHONE ENCOUNTER
Called and spoke with Chel and provided update and she expressed appreciation and shared her PCP ordered the study. Scheduled follow up for same day/time as  so they can visit together - appreciation expressed and holiday greetings.     ----- Message from Wilman Hanson sent at 12/13/2024  4:39 PM EST -----  Regarding: RE: Request for order  Schedule an appointment and she can have the scan in advance of that.  I will not order the scan if she is not committed to follow up.  ----- Message -----  From: Giana Ortiz MA  Sent: 12/13/2024   9:53 AM EST  To: Wilman Hanson MD  Subject: Request for order                                Chel called and is asking if you could put in a bone scan for her.   Reminded pt she has not been seen since 2/20/23 and pt stated they have had a lot going on and would appreciate the consideration     [facility] # [phone]     Thank you  Giana

## 2024-12-19 ENCOUNTER — LAB REQUISITION (OUTPATIENT)
Dept: LAB | Facility: HOSPITAL | Age: 84
End: 2024-12-19
Payer: MEDICARE

## 2024-12-19 DIAGNOSIS — B18.2 CHRONIC VIRAL HEPATITIS C (MULTI): ICD-10-CM

## 2024-12-19 DIAGNOSIS — E78.5 HYPERLIPIDEMIA, UNSPECIFIED: ICD-10-CM

## 2024-12-19 LAB
ALBUMIN SERPL BCP-MCNC: 4.1 G/DL (ref 3.4–5)
ALP SERPL-CCNC: 45 U/L (ref 33–136)
ALT SERPL W P-5'-P-CCNC: 22 U/L (ref 7–45)
ANION GAP SERPL CALC-SCNC: 11 MMOL/L (ref 10–20)
AST SERPL W P-5'-P-CCNC: 23 U/L (ref 9–39)
BASOPHILS # BLD AUTO: 0.06 X10*3/UL (ref 0–0.1)
BASOPHILS NFR BLD AUTO: 0.6 %
BILIRUB SERPL-MCNC: 0.8 MG/DL (ref 0–1.2)
BUN SERPL-MCNC: 16 MG/DL (ref 6–23)
CALCIUM SERPL-MCNC: 9.7 MG/DL (ref 8.6–10.3)
CHLORIDE SERPL-SCNC: 105 MMOL/L (ref 98–107)
CHOLEST SERPL-MCNC: 188 MG/DL (ref 0–199)
CHOLESTEROL/HDL RATIO: 3
CO2 SERPL-SCNC: 25 MMOL/L (ref 21–32)
CREAT SERPL-MCNC: 1.05 MG/DL (ref 0.5–1.05)
EGFRCR SERPLBLD CKD-EPI 2021: 53 ML/MIN/1.73M*2
EOSINOPHIL # BLD AUTO: 0.21 X10*3/UL (ref 0–0.4)
EOSINOPHIL NFR BLD AUTO: 1.9 %
ERYTHROCYTE [DISTWIDTH] IN BLOOD BY AUTOMATED COUNT: 12.9 % (ref 11.5–14.5)
GLUCOSE SERPL-MCNC: 112 MG/DL (ref 74–99)
HCT VFR BLD AUTO: 47.1 % (ref 36–46)
HDLC SERPL-MCNC: 62.3 MG/DL
HGB BLD-MCNC: 15.4 G/DL (ref 12–16)
IMM GRANULOCYTES # BLD AUTO: 0.05 X10*3/UL (ref 0–0.5)
IMM GRANULOCYTES NFR BLD AUTO: 0.5 % (ref 0–0.9)
LDLC SERPL CALC-MCNC: 96 MG/DL
LYMPHOCYTES # BLD AUTO: 2.28 X10*3/UL (ref 0.8–3)
LYMPHOCYTES NFR BLD AUTO: 20.9 %
MCH RBC QN AUTO: 31.2 PG (ref 26–34)
MCHC RBC AUTO-ENTMCNC: 32.7 G/DL (ref 32–36)
MCV RBC AUTO: 96 FL (ref 80–100)
MONOCYTES # BLD AUTO: 0.85 X10*3/UL (ref 0.05–0.8)
MONOCYTES NFR BLD AUTO: 7.8 %
NEUTROPHILS # BLD AUTO: 7.45 X10*3/UL (ref 1.6–5.5)
NEUTROPHILS NFR BLD AUTO: 68.3 %
NON HDL CHOLESTEROL: 126 MG/DL (ref 0–149)
NRBC BLD-RTO: 0 /100 WBCS (ref 0–0)
PLATELET # BLD AUTO: 230 X10*3/UL (ref 150–450)
POTASSIUM SERPL-SCNC: 4.5 MMOL/L (ref 3.5–5.3)
PROT SERPL-MCNC: 6.6 G/DL (ref 6.4–8.2)
RBC # BLD AUTO: 4.93 X10*6/UL (ref 4–5.2)
SODIUM SERPL-SCNC: 136 MMOL/L (ref 136–145)
TRIGL SERPL-MCNC: 148 MG/DL (ref 0–149)
VLDL: 30 MG/DL (ref 0–40)
WBC # BLD AUTO: 10.9 X10*3/UL (ref 4.4–11.3)

## 2024-12-19 PROCEDURE — 80061 LIPID PANEL: CPT

## 2024-12-19 PROCEDURE — 80053 COMPREHEN METABOLIC PANEL: CPT

## 2024-12-19 PROCEDURE — 85025 COMPLETE CBC W/AUTO DIFF WBC: CPT

## 2024-12-20 PROCEDURE — RXMED WILLOW AMBULATORY MEDICATION CHARGE

## 2024-12-21 ENCOUNTER — PHARMACY VISIT (OUTPATIENT)
Dept: PHARMACY | Facility: CLINIC | Age: 84
End: 2024-12-21
Payer: COMMERCIAL

## 2024-12-23 ENCOUNTER — APPOINTMENT (OUTPATIENT)
Dept: PAIN MEDICINE | Facility: CLINIC | Age: 84
End: 2024-12-23
Payer: MEDICARE

## 2024-12-27 ENCOUNTER — HOSPITAL ENCOUNTER (OUTPATIENT)
Dept: RADIOLOGY | Facility: HOSPITAL | Age: 84
Discharge: HOME | End: 2024-12-27
Payer: MEDICARE

## 2024-12-27 VITALS — HEIGHT: 64 IN | BODY MASS INDEX: 25.44 KG/M2 | WEIGHT: 149 LBS

## 2024-12-27 DIAGNOSIS — Z12.31 ENCOUNTER FOR SCREENING MAMMOGRAM FOR MALIGNANT NEOPLASM OF BREAST: ICD-10-CM

## 2024-12-27 DIAGNOSIS — M81.0 AGE-RELATED OSTEOPOROSIS WITHOUT CURRENT PATHOLOGICAL FRACTURE: ICD-10-CM

## 2024-12-27 PROCEDURE — 77067 SCR MAMMO BI INCL CAD: CPT

## 2024-12-27 PROCEDURE — 77080 DXA BONE DENSITY AXIAL: CPT | Performed by: RADIOLOGY

## 2024-12-27 PROCEDURE — 77080 DXA BONE DENSITY AXIAL: CPT

## 2025-01-26 DIAGNOSIS — R60.9 SWELLING: ICD-10-CM

## 2025-01-27 RX ORDER — FUROSEMIDE 20 MG/1
20 TABLET ORAL DAILY
Qty: 30 TABLET | Refills: 11 | Status: SHIPPED | OUTPATIENT
Start: 2025-01-27

## 2025-01-31 PROCEDURE — RXMED WILLOW AMBULATORY MEDICATION CHARGE

## 2025-02-05 ENCOUNTER — PHARMACY VISIT (OUTPATIENT)
Dept: PHARMACY | Facility: CLINIC | Age: 85
End: 2025-02-05
Payer: COMMERCIAL

## 2025-02-17 ENCOUNTER — APPOINTMENT (OUTPATIENT)
Dept: ENDOCRINOLOGY | Facility: CLINIC | Age: 85
End: 2025-02-17
Payer: MEDICARE

## 2025-02-17 DIAGNOSIS — M81.0 OSTEOPOROSIS WITHOUT CURRENT PATHOLOGICAL FRACTURE, UNSPECIFIED OSTEOPOROSIS TYPE: Primary | ICD-10-CM

## 2025-02-17 PROCEDURE — 1159F MED LIST DOCD IN RCRD: CPT | Performed by: INTERNAL MEDICINE

## 2025-02-17 PROCEDURE — 99213 OFFICE O/P EST LOW 20 MIN: CPT | Performed by: INTERNAL MEDICINE

## 2025-02-17 PROCEDURE — 1160F RVW MEDS BY RX/DR IN RCRD: CPT | Performed by: INTERNAL MEDICINE

## 2025-03-03 ENCOUNTER — APPOINTMENT (OUTPATIENT)
Dept: CARDIOLOGY | Facility: CLINIC | Age: 85
End: 2025-03-03
Payer: MEDICARE

## 2025-03-10 NOTE — PROGRESS NOTES
"Patient ID: Chel Lowe is a 85 y.o. female.  Referring Physician: No referring provider defined for this encounter.  Primary Care Provider: Edward Rogers MD    Subjective    Interval History:  Overall doing well, we discussed her treatment plan. No other significant concerns today.    They deny fever, chills, chest pain, SOB, nausea, vomiting, diarrhea, constipation, dysuria, or any other concerning signs of symptoms.      Past medical history:  Hypertension  Osteoporosis  Osteoarthritis  Mild renal insufficiency (GFR 57 in 2020)     Surgical history:   ×1  Removal of parathyroid gland , benign  Abdominoplasty   Shoulder and elbow surgeries.     OB/GYN history:  Hormone replacement therapy beginning in  and continuing to the present time.  Presently on estradiol 1 mg daily, takes 200 mg of progesterone days 1-12 each month.     No recalled history of abnormal Pap smears.     Family history:  No family history of cancer.  She is an only child.    Objective    BSA: 1.77 meters squared  /71 (BP Location: Left arm, Patient Position: Sitting, BP Cuff Size: Adult)   Pulse 102   Temp 36.3 °C (97.3 °F) (Temporal)   Resp 18   Ht (S) 1.607 m (5' 3.27\")   Wt 69.8 kg (153 lb 14.1 oz)   SpO2 97%   BMI 27.03 kg/m²      Physical Exam  Constitutional:       General: She is not in acute distress.     Appearance: Normal appearance. She is normal weight. She is not toxic-appearing.   HENT:      Head: Normocephalic.      Mouth/Throat:      Mouth: Mucous membranes are moist.      Pharynx: Oropharynx is clear.   Eyes:      Extraocular Movements: Extraocular movements intact.      Conjunctiva/sclera: Conjunctivae normal.      Pupils: Pupils are equal, round, and reactive to light.   Cardiovascular:      Rate and Rhythm: Normal rate and regular rhythm.      Heart sounds: Normal heart sounds. No murmur heard.     No friction rub. No gallop.   Pulmonary:      Effort: Pulmonary effort is normal. "      Breath sounds: Normal breath sounds. No wheezing or rhonchi.   Abdominal:      General: Abdomen is flat. Bowel sounds are normal. There is no distension.      Palpations: Abdomen is soft.      Tenderness: There is no abdominal tenderness.   Genitourinary:     Comments: Normal external female genitalia without lesions or masses  Speculum exam: Smooth vagina without lesions or masses, radiation changes noted in the vagina.  Bimanual exam: smooth vagina without lesions or masses, surgically absent uterus, cervix, adnexa    Musculoskeletal:         General: No swelling. Normal range of motion.      Cervical back: Normal range of motion.   Skin:     General: Skin is warm and dry.   Neurological:      General: No focal deficit present.      Mental Status: She is alert and oriented to person, place, and time.   Psychiatric:         Mood and Affect: Mood normal.         Behavior: Behavior normal.         Performance Status:  Asymptomatic    Assessment/Plan     Oncology History Overview Note   Tumor History:   12/20: [facility] BSO, sentinel lymph nodes, Stage IA grade 3, serous, endometrial cancer. 9% invasion, Lymphatic vessel invasion present, MMR intact, Her2 expression negative  1/2021-2/2021: Chemo started, HSR to taxolx2 with change to abraxane     Endometrial carcinoma (Multi)   11/3/2023 Initial Diagnosis    Endometrial carcinoma (CMS/HCC)       85 y.o.  female with history of Stage IA grade 3, serous endometrial cancer s/p TLH/BSO, SLN excision on 12/14/20 followed by adjuvant therapy with  2 cycles carboplatin and taxol and 1 cycle carboplatin and abraxane completed 2/23/21. She completed vaginal brachytherapy at [facility] on 4/8/21.      # Endometrial cancer  - Physical examination was within normal limits today, no evidence of recurrence   - We reviewed signs and symptoms of possible recurrence with the patient and she will call our office should she experience any of these  - Follow up in 6 months, followed by yearly  thereafter       Scribe Attestation  By signing my name below, I, Mike George   attest that this documentation has been prepared under the direction and in the presence of Marjan Mercedes MD.     Provider Attestation - Scribe documentation    All medical record entries made by the Scribe were at my direction and personally dictated by me. I have reviewed the chart and agree that the record accurately reflects my personal performance of the history, physical exam, discussion and plan.    Marjan Mercedes MD

## 2025-03-11 ENCOUNTER — APPOINTMENT (OUTPATIENT)
Dept: PAIN MEDICINE | Facility: CLINIC | Age: 85
End: 2025-03-11
Payer: MEDICARE

## 2025-03-11 ENCOUNTER — APPOINTMENT (OUTPATIENT)
Dept: OTOLARYNGOLOGY | Facility: CLINIC | Age: 85
End: 2025-03-11
Payer: MEDICARE

## 2025-03-11 ENCOUNTER — OFFICE VISIT (OUTPATIENT)
Dept: PAIN MEDICINE | Facility: CLINIC | Age: 85
End: 2025-03-11
Payer: MEDICARE

## 2025-03-11 ENCOUNTER — OFFICE VISIT (OUTPATIENT)
Dept: GYNECOLOGIC ONCOLOGY | Facility: CLINIC | Age: 85
End: 2025-03-11
Payer: MEDICARE

## 2025-03-11 VITALS — HEART RATE: 84 BPM | DIASTOLIC BLOOD PRESSURE: 76 MMHG | SYSTOLIC BLOOD PRESSURE: 135 MMHG | RESPIRATION RATE: 20 BRPM

## 2025-03-11 VITALS
OXYGEN SATURATION: 97 % | WEIGHT: 153.88 LBS | SYSTOLIC BLOOD PRESSURE: 129 MMHG | DIASTOLIC BLOOD PRESSURE: 71 MMHG | BODY MASS INDEX: 27.27 KG/M2 | HEIGHT: 63 IN | TEMPERATURE: 97.3 F | HEART RATE: 102 BPM | RESPIRATION RATE: 18 BRPM

## 2025-03-11 DIAGNOSIS — C54.1 ENDOMETRIAL CARCINOMA (MULTI): Primary | ICD-10-CM

## 2025-03-11 DIAGNOSIS — M54.16 LUMBAR RADICULITIS: ICD-10-CM

## 2025-03-11 DIAGNOSIS — Z85.42 ENCOUNTER FOR FOLLOW-UP SURVEILLANCE OF UTERINE CANCER: ICD-10-CM

## 2025-03-11 DIAGNOSIS — M48.062 SPINAL STENOSIS OF LUMBAR REGION WITH NEUROGENIC CLAUDICATION: ICD-10-CM

## 2025-03-11 DIAGNOSIS — Z08 ENCOUNTER FOR FOLLOW-UP SURVEILLANCE OF UTERINE CANCER: ICD-10-CM

## 2025-03-11 PROCEDURE — 1160F RVW MEDS BY RX/DR IN RCRD: CPT | Performed by: STUDENT IN AN ORGANIZED HEALTH CARE EDUCATION/TRAINING PROGRAM

## 2025-03-11 PROCEDURE — 1126F AMNT PAIN NOTED NONE PRSNT: CPT | Performed by: STUDENT IN AN ORGANIZED HEALTH CARE EDUCATION/TRAINING PROGRAM

## 2025-03-11 PROCEDURE — G2211 COMPLEX E/M VISIT ADD ON: HCPCS | Performed by: ANESTHESIOLOGY

## 2025-03-11 PROCEDURE — 99214 OFFICE O/P EST MOD 30 MIN: CPT | Performed by: STUDENT IN AN ORGANIZED HEALTH CARE EDUCATION/TRAINING PROGRAM

## 2025-03-11 PROCEDURE — 1036F TOBACCO NON-USER: CPT | Performed by: ANESTHESIOLOGY

## 2025-03-11 PROCEDURE — 1125F AMNT PAIN NOTED PAIN PRSNT: CPT | Performed by: ANESTHESIOLOGY

## 2025-03-11 PROCEDURE — G2211 COMPLEX E/M VISIT ADD ON: HCPCS | Performed by: STUDENT IN AN ORGANIZED HEALTH CARE EDUCATION/TRAINING PROGRAM

## 2025-03-11 PROCEDURE — 1159F MED LIST DOCD IN RCRD: CPT | Performed by: STUDENT IN AN ORGANIZED HEALTH CARE EDUCATION/TRAINING PROGRAM

## 2025-03-11 PROCEDURE — 1159F MED LIST DOCD IN RCRD: CPT | Performed by: ANESTHESIOLOGY

## 2025-03-11 PROCEDURE — 3078F DIAST BP <80 MM HG: CPT | Performed by: ANESTHESIOLOGY

## 2025-03-11 PROCEDURE — 3078F DIAST BP <80 MM HG: CPT | Performed by: STUDENT IN AN ORGANIZED HEALTH CARE EDUCATION/TRAINING PROGRAM

## 2025-03-11 PROCEDURE — 1036F TOBACCO NON-USER: CPT | Performed by: STUDENT IN AN ORGANIZED HEALTH CARE EDUCATION/TRAINING PROGRAM

## 2025-03-11 PROCEDURE — 3075F SYST BP GE 130 - 139MM HG: CPT | Performed by: ANESTHESIOLOGY

## 2025-03-11 PROCEDURE — 99213 OFFICE O/P EST LOW 20 MIN: CPT | Performed by: ANESTHESIOLOGY

## 2025-03-11 PROCEDURE — 3074F SYST BP LT 130 MM HG: CPT | Performed by: STUDENT IN AN ORGANIZED HEALTH CARE EDUCATION/TRAINING PROGRAM

## 2025-03-11 RX ORDER — HYDROCODONE BITARTRATE AND ACETAMINOPHEN 5; 325 MG/1; MG/1
1 TABLET ORAL 3 TIMES DAILY PRN
Qty: 84 TABLET | Refills: 0 | Status: SHIPPED | OUTPATIENT
Start: 2025-04-29

## 2025-03-11 RX ORDER — HYDROCODONE BITARTRATE AND ACETAMINOPHEN 5; 325 MG/1; MG/1
1 TABLET ORAL 3 TIMES DAILY PRN
Qty: 84 TABLET | Refills: 0 | Status: SHIPPED | OUTPATIENT
Start: 2025-04-01 | End: 2025-04-29

## 2025-03-11 RX ORDER — HYDROCODONE BITARTRATE AND ACETAMINOPHEN 5; 325 MG/1; MG/1
1 TABLET ORAL 3 TIMES DAILY PRN
Qty: 84 TABLET | Refills: 0 | Status: SHIPPED | OUTPATIENT
Start: 2025-05-27

## 2025-03-11 ASSESSMENT — PAIN SCALES - GENERAL
PAINLEVEL_OUTOF10: 1
PAINLEVEL_OUTOF10: 0-NO PAIN

## 2025-03-11 ASSESSMENT — PAIN - FUNCTIONAL ASSESSMENT: PAIN_FUNCTIONAL_ASSESSMENT: 0-10

## 2025-03-11 ASSESSMENT — PAIN DESCRIPTION - DESCRIPTORS: DESCRIPTORS: RADIATING

## 2025-03-11 NOTE — PROGRESS NOTES
Subjective   Patient ID: Chel Lowe is a 85 y.o. female presenting with complaint of lower back pain with radicular symptoms into the left lower extremity.  Patient is here today primarily for refill of her pain medication.     HPI:    Chel Lowe is a 85 y.o. female presenting with complaint of lower back pain with radicular symptoms into the left lower extremity.  Patient is here today primarily for refill of her pain medication.  Patient's pain has primarily been controlled with the Norco 5 mg 3 times daily.  Patient says that her pain is about 1 out of 10 in intensity when she takes her medication.  Patient says she is not interested in any pain interventions or injections at this time, and that she is happy with her current Norco regimen.         Review of Systems   13-point ROS done and negative except for HPI.     Current Outpatient Medications   Medication Instructions    calcium carbonate-vitamin D3 600 mg-5 mcg (200 unit) tablet 1 tablet, 2 times daily    dapagliflozin propanediol (FARXIGA) 10 mg, oral, Daily    furosemide (LASIX) 20 mg, oral, Daily    glucosamine-chondroit-vit C-Mn 500-400 mg capsule 1 capsule, 2 times daily    HYDROcodone-acetaminophen (Norco) 5-325 mg tablet 1 tablet, oral, 3 times daily PRN    HYDROcodone-acetaminophen (Norco) 5-325 mg tablet 1 tablet, oral, 3 times daily PRN    HYDROcodone-acetaminophen (Norco) 5-325 mg tablet 1 tablet, oral, 3 times daily PRN    magnesium gluconate (Magonate) 27.5 mg magne- sium (500 mg) tablet 55 mg, 2 times daily    methocarbamol (ROBAXIN) 750 mg, oral, 3 times daily PRN    methylPREDNISolone (Medrol Dospak) 4 mg tablets TAKE AS DIRECTED ON PACK. TAKE WITH FOOD.    metoprolol succinate XL (TOPROL XL) 200 mg, oral, Daily, Do not crush or chew.    metoprolol tartrate (Lopressor) 50 mg tablet TAKE 1 TABLET BY MOUTH TWO TIMES A DAY    multivitamin tablet Take by mouth.    mupirocin (Bactroban) 2 % ointment apply to the affected area(s) twice daily     naloxone (Narcan) 4 mg/0.1 mL nasal spray Administer into affected nostril(s). Used as directed    naloxone (NARCAN) 4 mg, nasal, As needed, May repeat every 2-3 minutes if needed, alternating nostrils, until medical assistance becomes available.    pantoprazole (PROTONIX) 40 mg, oral, Daily    risedronate (Actonel) 35 mg tablet Take 1 tablet by mouth once a week in morning with full glass of water on an empty stomach. No food, drink, meds, or lying down for 30 minutes after.    risedronate (Actonel) 35 mg tablet Take 1 tablet (35mg) by mouth once a week in morning with full glass of water on an empty stomach. No food, drink, meds, or lying down for 30 minutes after.    spironolactone (Aldactone) 50 mg tablet Take 1 tablet (50mg) by mouth once daily       Past Medical History:   Diagnosis Date    Endometrial cancer (Multi)         Past Surgical History:   Procedure Laterality Date     SECTION, CLASSIC  2013     Section    HYSTERECTOMY      SHOULDER SURGERY  2013    Shoulder Surgery        No family history on file.     No Known Allergies     Objective     Vitals:    25 1326   BP: 135/76   Pulse: 84   Resp: 20        Physical Exam  General: NAD, well groomed, well nourished  Eyes: Non-icteric sclera, EOMI  Ears, Nose, Mouth, and Throat: External ears and nose appear to be without deformity or rash. No lesions or masses noted. Hearing is grossly intact.   Neck: Trachea midline  Respiratory: Nonlabored breathing   Cardiovascular: No noted significant peripheral edema   Skin: No rashes or open lesions/ulcers identified on skin.        Assessment/Plan   Chel Lowe is a 85 y.o. female presenting with complaint of lower back pain with radicular symptoms into the left lower extremity.  Patient is here today primarily for refill of her pain medication.     PDMP score: 40  Avg MME/day: 15       Plan:  -Patient's pain medication to be refilled    Follow up: As needed     The patient was  invited to contact us back anytime with any questions or concerns and follow-up with us in the office as needed.     Diagnoses and all orders for this visit:  Lumbar radiculitis  Spinal stenosis of lumbar region with neurogenic claudication      This note was generated with the aid of dictation software, there may be typos despite my attempts at proofreading.

## 2025-03-22 PROCEDURE — RXMED WILLOW AMBULATORY MEDICATION CHARGE

## 2025-03-25 ENCOUNTER — PHARMACY VISIT (OUTPATIENT)
Dept: PHARMACY | Facility: CLINIC | Age: 85
End: 2025-03-25
Payer: COMMERCIAL

## 2025-03-27 NOTE — PROGRESS NOTES
"Virtual or Telephone Consent    An interactive audio and video telecommunication system which permits real time communications between the patient (at the originating site) and provider (at the distant site) was utilized to provide this telehealth service.   Verbal consent was requested and obtained from Chel Lowe on this date, 25 for a telehealth visit and the patient's location was confirmed at the time of the visit.    History Of Present Illness  hCel Lowe is a 85 y.o. female with osteoporosis     ACTONEL 35 mg/week for 20 years     Calcium 600 +D BID  Vitamin D 1400 IU/day     Parathyroid surgery, , one gland.     Denies fracture history   Loss of height 3\"    Past Medical History  She has a past medical history of Endometrial cancer (Multi).    Surgical History  She has a past surgical history that includes  section, classic (2013); Shoulder surgery (2013); and Hysterectomy.     Social History  She reports that she has never smoked. She has never used smokeless tobacco. She reports that she does not drink alcohol and does not use drugs.    Family History  No family history on file.    Medications  Current Outpatient Medications   Medication Instructions    calcium carbonate-vitamin D3 600 mg-5 mcg (200 unit) tablet 1 tablet, 2 times daily    dapagliflozin propanediol (FARXIGA) 10 mg, oral, Daily    furosemide (LASIX) 20 mg, oral, Daily    glucosamine-chondroit-vit C-Mn 500-400 mg capsule 1 capsule, 2 times daily    [START ON 2025] HYDROcodone-acetaminophen (Norco) 5-325 mg tablet 1 tablet, oral, 3 times daily PRN    [START ON 2025] HYDROcodone-acetaminophen (Norco) 5-325 mg tablet 1 tablet, oral, 3 times daily PRN    [START ON 2025] HYDROcodone-acetaminophen (Norco) 5-325 mg tablet 1 tablet, oral, 3 times daily PRN    magnesium gluconate (Magonate) 27.5 mg magne- sium (500 mg) tablet 55 mg, 2 times daily    methocarbamol (ROBAXIN) 750 mg, oral, 3 times daily " PRN    methylPREDNISolone (Medrol Dospak) 4 mg tablets TAKE AS DIRECTED ON PACK. TAKE WITH FOOD.    metoprolol succinate XL (TOPROL XL) 200 mg, oral, Daily, Do not crush or chew.    metoprolol tartrate (Lopressor) 50 mg tablet TAKE 1 TABLET BY MOUTH TWO TIMES A DAY    multivitamin tablet Take by mouth.    mupirocin (Bactroban) 2 % ointment apply to the affected area(s) twice daily    naloxone (Narcan) 4 mg/0.1 mL nasal spray Administer into affected nostril(s). Used as directed    naloxone (NARCAN) 4 mg, nasal, As needed, May repeat every 2-3 minutes if needed, alternating nostrils, until medical assistance becomes available.    pantoprazole (PROTONIX) 40 mg, oral, Daily    risedronate (Actonel) 35 mg tablet Take 1 tablet by mouth once a week in morning with full glass of water on an empty stomach. No food, drink, meds, or lying down for 30 minutes after.    risedronate (Actonel) 35 mg tablet Take 1 tablet (35mg) by mouth once a week in morning with full glass of water on an empty stomach. No food, drink, meds, or lying down for 30 minutes after.    spironolactone (Aldactone) 50 mg tablet Take 1 tablet (50mg) by mouth once daily       Allergies  Patient has no known allergies.      Last Recorded Vitals  There were no vitals taken for this visit.    Physical Exam  Constitutional:       General: She is not in acute distress.  HENT:      Head: Normocephalic.   Neurological:      Mental Status: She is alert.   Psychiatric:         Mood and Affect: Affect normal.          Relevant Results  Lab Results   Component Value Date    TSH 2.23 01/30/2018         IMPRESSION  OSTEOPOROSIS    RECOMMENDATIONS  Continue Actonel

## 2025-04-14 ENCOUNTER — TELEPHONE (OUTPATIENT)
Dept: PAIN MEDICINE | Facility: CLINIC | Age: 85
End: 2025-04-14
Payer: MEDICARE

## 2025-04-14 DIAGNOSIS — M54.16 LUMBAR RADICULITIS: ICD-10-CM

## 2025-04-14 DIAGNOSIS — M51.369 DEGENERATION OF L4-L5 INTERVERTEBRAL DISC: ICD-10-CM

## 2025-04-14 RX ORDER — PREDNISONE 20 MG/1
TABLET ORAL
Qty: 10 TABLET | Refills: 0 | Status: SHIPPED | OUTPATIENT
Start: 2025-04-14

## 2025-04-14 NOTE — TELEPHONE ENCOUNTER
Patient is having a flare up of left leg spasms,   She would like to know if prednisone could be called into [facility] in [place]

## 2025-04-21 DIAGNOSIS — I48.0 PAROXYSMAL ATRIAL FIBRILLATION (MULTI): Primary | ICD-10-CM

## 2025-04-22 ENCOUNTER — SPECIALTY PHARMACY (OUTPATIENT)
Dept: PHARMACY | Facility: CLINIC | Age: 85
End: 2025-04-22

## 2025-04-25 DIAGNOSIS — I48.0 PAROXYSMAL ATRIAL FIBRILLATION (MULTI): ICD-10-CM

## 2025-04-30 PROCEDURE — RXMED WILLOW AMBULATORY MEDICATION CHARGE

## 2025-05-01 ENCOUNTER — PHARMACY VISIT (OUTPATIENT)
Dept: PHARMACY | Facility: CLINIC | Age: 85
End: 2025-05-01
Payer: COMMERCIAL

## 2025-05-05 ENCOUNTER — APPOINTMENT (OUTPATIENT)
Dept: CARDIOLOGY | Facility: CLINIC | Age: 85
End: 2025-05-05
Payer: MEDICARE

## 2025-05-05 ENCOUNTER — TELEMEDICINE (OUTPATIENT)
Dept: CARDIOLOGY | Facility: CLINIC | Age: 85
End: 2025-05-05
Payer: MEDICARE

## 2025-05-05 DIAGNOSIS — I48.0 PAROXYSMAL ATRIAL FIBRILLATION (MULTI): ICD-10-CM

## 2025-05-05 DIAGNOSIS — I11.0 HYPERTENSIVE HEART DISEASE WITH HEART FAILURE: ICD-10-CM

## 2025-05-05 DIAGNOSIS — R94.31 ABNORMAL EKG: ICD-10-CM

## 2025-05-05 DIAGNOSIS — Z13.1 ENCOUNTER FOR SCREENING FOR DIABETES MELLITUS: ICD-10-CM

## 2025-05-05 DIAGNOSIS — E78.5 HYPERLIPIDEMIA, UNSPECIFIED HYPERLIPIDEMIA TYPE: ICD-10-CM

## 2025-05-05 DIAGNOSIS — E55.9 VITAMIN D DEFICIENCY, UNSPECIFIED: ICD-10-CM

## 2025-05-05 DIAGNOSIS — I10 BENIGN HYPERTENSION: Primary | ICD-10-CM

## 2025-05-05 PROCEDURE — 99214 OFFICE O/P EST MOD 30 MIN: CPT | Performed by: INTERNAL MEDICINE

## 2025-05-05 NOTE — PROGRESS NOTES
Primary Care Physician: Edward Rogers MD    This is a virtual visit.  A secure audio communication was established with the patient at home.  Patient consented for this visit.    Date of Visit: 05/05/2025  3:20 PM EDT  Location of visit:  [facility]   Type of Visit: Follow up        Dear Dr Rogers,    DIAGNOSES: PAF. SVT. HTN. Preserved LV systolic function. HFpEF. NYHA Class II.       HPI / Summary:   Chel Lowe is a 85 y.o. female  who returns for routine follow up.  She has been doing well and active, taking care of her , without any significant cardiac symptomatology.  She denies any paroxysmal palpitations.      12 system review is negative except as noted above  Accompanied by her  who contributed to the history       Medical History:   Medical History[1]    Social History:   Tobacco Use: Low Risk  (3/11/2025)    Patient History     Smoking Tobacco Use: Never     Smokeless Tobacco Use: Never     Passive Exposure: Not on file         MEDICATIONS:   Current Outpatient Medications   Medication Instructions    calcium carbonate-vitamin D3 600 mg-5 mcg (200 unit) tablet 1 tablet, 2 times daily    dapagliflozin propanediol (FARXIGA) 10 mg, oral, Daily    furosemide (LASIX) 20 mg, oral, Daily    glucosamine-chondroit-vit C-Mn 500-400 mg capsule 1 capsule, 2 times daily    [START ON 5/27/2025] HYDROcodone-acetaminophen (Norco) 5-325 mg tablet 1 tablet, oral, 3 times daily PRN    HYDROcodone-acetaminophen (Norco) 5-325 mg tablet 1 tablet, oral, 3 times daily PRN    HYDROcodone-acetaminophen (Norco) 5-325 mg tablet 1 tablet, oral, 3 times daily PRN    magnesium gluconate (Magonate) 27.5 mg magne- sium (500 mg) tablet 55 mg, 2 times daily    methylPREDNISolone (Medrol Dospak) 4 mg tablets TAKE AS DIRECTED ON PACK. TAKE WITH FOOD.    metoprolol tartrate (Lopressor) 50 mg tablet TAKE 1 TABLET BY MOUTH TWO TIMES A DAY    multivitamin tablet Take by mouth.    mupirocin (Bactroban) 2 % ointment apply to  the affected area(s) twice daily    naloxone (Narcan) 4 mg/0.1 mL nasal spray Administer into affected nostril(s). Used as directed    naloxone (NARCAN) 4 mg, nasal, As needed, May repeat every 2-3 minutes if needed, alternating nostrils, until medical assistance becomes available.    pantoprazole (PROTONIX) 40 mg, oral, Daily    predniSONE (Deltasone) 20 mg tablet Take one tab po bid for 5 days    risedronate (Actonel) 35 mg tablet Take 1 tablet by mouth once a week in morning with full glass of water on an empty stomach. No food, drink, meds, or lying down for 30 minutes after.    risedronate (Actonel) 35 mg tablet Take 1 tablet (35mg) by mouth once a week in morning with full glass of water on an empty stomach. No food, drink, meds, or lying down for 30 minutes after.    rivaroxaban (XARELTO) 20 mg, oral, Daily, Take with food.    spironolactone (Aldactone) 50 mg tablet Take 1 tablet (50mg) by mouth once daily         LABS:    CBC with Differential:    Lab Results   Component Value Date    WBC 10.9 12/19/2024    RBC 4.93 12/19/2024    HGB 15.4 12/19/2024    HCT 47.1 (H) 12/19/2024     12/19/2024    MCV 96 12/19/2024    MCH 31.2 12/19/2024    MCHC 32.7 12/19/2024    RDW 12.9 12/19/2024    NRBC 0.0 12/19/2024    LYMPHOPCT 20.9 12/19/2024    MONOPCT 7.8 12/19/2024    EOSPCT 1.9 12/19/2024    BASOPCT 0.6 12/19/2024    MONOSABS 0.85 (H) 12/19/2024    LYMPHSABS 2.28 12/19/2024    EOSABS 0.21 12/19/2024    BASOSABS 0.06 12/19/2024     CMP:    Lab Results   Component Value Date     12/19/2024    K 4.5 12/19/2024     12/19/2024    CO2 25 12/19/2024    BUN 16 12/19/2024    CREATININE 1.05 12/19/2024    GLUCOSE 112 (H) 12/19/2024    PROT 6.6 12/19/2024    CALCIUM 9.7 12/19/2024    BILITOT 0.8 12/19/2024    ALKPHOS 45 12/19/2024    AST 23 12/19/2024    ALT 22 12/19/2024     BMP:    Lab Results   Component Value Date     12/19/2024    K 4.5 12/19/2024     12/19/2024    CO2 25 12/19/2024    BUN  16 12/19/2024    CREATININE 1.05 12/19/2024    CALCIUM 9.7 12/19/2024    GLUCOSE 112 (H) 12/19/2024     Magnesium:  Lab Results   Component Value Date    MG 2.07 04/27/2021     Troponin:    Lab Results   Component Value Date    TROPHS 15 (H) 06/29/2022    TROPHS 16 (H) 06/29/2022     BNP:   Lab Results   Component Value Date     (H) 11/18/2024         Lipid Panel:  Lab Results   Component Value Date    HDL 62.3 12/19/2024    CHHDL 3.0 12/19/2024    VLDL 30 12/19/2024    TRIG 148 12/19/2024    NHDL 126 12/19/2024        Lab work and imaging results independently reviewed by me     Visit Vitals  OB Status Postmenopausal   Smoking Status Never     DIAGNOSES: PAF. SVT. HTN. Preserved LV systolic function. HFpEF. NYHA Class II.     I am pleased to see that Chel has been doing well from a cardiac perspective, remaining active.  She has been continuing to take rivaroxaban for anticoagulation.  Advised her to continue with medications as prescribed and regular follow-up with you.    Thank you so much for the opportunity to participate in the care of this very pleasant lady. Please do not hesitate to contact me if I could be of any assistance in her future care.    Sincerely        Angeli Hamilton M.D.                05/05/25 at 6:09 PM - Sis Hamilton MD      Orders:  No orders of the defined types were placed in this encounter.        Followup Appts:  Future Appointments   Date Time Provider Department Center   6/24/2025  1:00 PM MD Francheska Joseph [facility]   9/9/2025  2:15 PM MD Francheska Joseph [facility]   9/9/2025  3:20 PM JEREMÍAS [facility]           [1]   Past Medical History:  Diagnosis Date    Endometrial cancer (Multi)

## 2025-06-21 PROCEDURE — RXMED WILLOW AMBULATORY MEDICATION CHARGE

## 2025-06-24 ENCOUNTER — OFFICE VISIT (OUTPATIENT)
Dept: PAIN MEDICINE | Facility: CLINIC | Age: 85
End: 2025-06-24
Payer: MEDICARE

## 2025-06-24 VITALS
OXYGEN SATURATION: 95 % | SYSTOLIC BLOOD PRESSURE: 109 MMHG | DIASTOLIC BLOOD PRESSURE: 76 MMHG | RESPIRATION RATE: 16 BRPM | HEART RATE: 76 BPM

## 2025-06-24 DIAGNOSIS — M48.062 SPINAL STENOSIS OF LUMBAR REGION WITH NEUROGENIC CLAUDICATION: ICD-10-CM

## 2025-06-24 DIAGNOSIS — M54.16 LUMBAR RADICULITIS: ICD-10-CM

## 2025-06-24 DIAGNOSIS — Z79.899 ENCOUNTER FOR LONG-TERM (CURRENT) USE OF HIGH-RISK MEDICATION: Primary | ICD-10-CM

## 2025-06-24 PROCEDURE — 99214 OFFICE O/P EST MOD 30 MIN: CPT | Performed by: NURSE PRACTITIONER

## 2025-06-24 PROCEDURE — G2211 COMPLEX E/M VISIT ADD ON: HCPCS | Performed by: NURSE PRACTITIONER

## 2025-06-24 PROCEDURE — 3078F DIAST BP <80 MM HG: CPT | Performed by: NURSE PRACTITIONER

## 2025-06-24 PROCEDURE — 1125F AMNT PAIN NOTED PAIN PRSNT: CPT | Performed by: NURSE PRACTITIONER

## 2025-06-24 PROCEDURE — 1160F RVW MEDS BY RX/DR IN RCRD: CPT | Performed by: NURSE PRACTITIONER

## 2025-06-24 PROCEDURE — 1159F MED LIST DOCD IN RCRD: CPT | Performed by: NURSE PRACTITIONER

## 2025-06-24 PROCEDURE — 3074F SYST BP LT 130 MM HG: CPT | Performed by: NURSE PRACTITIONER

## 2025-06-24 PROCEDURE — 1036F TOBACCO NON-USER: CPT | Performed by: NURSE PRACTITIONER

## 2025-06-24 RX ORDER — METHOCARBAMOL 750 MG/1
750 TABLET, FILM COATED ORAL 3 TIMES DAILY
COMMUNITY

## 2025-06-24 RX ORDER — PREDNISOLONE ACETATE 10 MG/ML
SUSPENSION/ DROPS OPHTHALMIC
COMMUNITY
Start: 2025-06-18

## 2025-06-24 RX ORDER — HYDROCODONE BITARTRATE AND ACETAMINOPHEN 5; 325 MG/1; MG/1
1 TABLET ORAL 3 TIMES DAILY PRN
Qty: 84 TABLET | Refills: 0 | Status: SHIPPED | OUTPATIENT
Start: 2025-07-22

## 2025-06-24 RX ORDER — HYDROCODONE BITARTRATE AND ACETAMINOPHEN 5; 325 MG/1; MG/1
1 TABLET ORAL 3 TIMES DAILY PRN
Qty: 84 TABLET | Refills: 0 | Status: SHIPPED | OUTPATIENT
Start: 2025-08-19 | End: 2025-09-16

## 2025-06-24 RX ORDER — KETOROLAC TROMETHAMINE 5 MG/ML
SOLUTION OPHTHALMIC
COMMUNITY
Start: 2025-06-18

## 2025-06-24 RX ORDER — HYDROCODONE BITARTRATE AND ACETAMINOPHEN 5; 325 MG/1; MG/1
1 TABLET ORAL 3 TIMES DAILY PRN
Qty: 84 TABLET | Refills: 0 | Status: SHIPPED | OUTPATIENT
Start: 2025-06-24 | End: 2025-07-22

## 2025-06-24 ASSESSMENT — PAIN SCALES - GENERAL: PAINLEVEL_OUTOF10: 10-WORST PAIN EVER

## 2025-06-24 NOTE — PROGRESS NOTES
Chel follows up for interval reevaluation of chronic pain. Endorses low back pain and bilateral leg, well controlled with medication.  Rated 1/10 today.  Norco 5 mg TID and Robaxin prn is very effective. Uses OTC magnesium for occasional OIC.  Keeps her house and stays active.  She is not interested in procedural interventions.    OARRS:  Lindsay Méndez, APRN-CNP on 6/24/2025 10:24 AM  I have personally reviewed the OARRS report for Chel Lowe. I have considered the risks of abuse, dependence, addiction and diversion    Is the patient prescribed a combination of a benzodiazepine and opioid?  No    Last Urine Drug Screen / ordered today: Yes  No results found for this or any previous visit (from the past 8760 hours).  Results are as expected.     Controlled Substance Agreement:  Date of the Last Agreement: 6/2025  Reviewed Controlled Substance Agreement including but not limited to the benefits, risks, and alternatives to treatment with a Controlled Substance medication(s).    ROS otherwise negative aside from what was mentioned above in HPI.    Problem List[1]  Medical History[2]  Surgical History[3]  Family History[4]  Social History[5]      ALLERGIES  Allergies[6]      MEDICATIONS  Current Medications[7]        For continuity:   Given the patient's report of reduced pain and improved functional ability without adverse effects, it is reasonable to treat with narcotic medications. The terms of the opioid agreement as well as the potential risks and adverse effects of the patient's medication regimen were discussed in detail. This includes if applicable due to dosage of medication permission to discuss and coordinate care with other treatment providers relevant to the patients condition. The patient verbalized understanding.      Risks and side effects of chronic opioid therapy including but not limited to tolerance, dependence, constipation, hyperalgesia, cognitive side effects, addiction and possible death due  to overuse and or misuse were discussed. I also discussed that such medications when co-administered with other sedative agents including but not limited to alcohol, benzodiazepines, sedative hypnotics and illegal drugs could pose life threatening consequences including death. I also explained the impact that the administration of such medication has on a patient with obstructive sleep apnea and continued recommendations for use of apnea devices if ordered are prescribed by other physicians. In order to effectively and safely treat the pain, I also emphasized the importance of compliance with the treatment plan, as well as compliance with the terms of the opioid agreement, which was reviewed in detail. I explained the importance of being responsible with the medications and to take these only as prescribed, never in excess and never for reasons other than pain reduction. The patient was counseled on keeping the medications safe and locked away from children and other adults as well as disposal methods and options. The patient understood the risks and instructions.      I also discussed with the patient in detail that based on the clinical response to the opioid medications and improvements of activities of daily living, sleep, and work performance in light of compliance with the treatment plan we can continue this form of therapy for the above chronic pain. The goal and rationale used for current treatment with chronic opioid medication is to control the pain and alleviate disability induced by the chronic pain condition noted above after failures of other non-opioid and nonpharmacological modalities to treat the chronic pain and the symptoms associated with have failed. The patient understood the goals in terms of the above treatment plan and had no further questions prior to leaving the office today.      Of note, the above-mentioned diagnoses/conditions and expected fluctuating nature of pain, and pain  characteristic changes may lead to prolonged functional impairment requiring frequent and multiple reassessments with continued high level medical decision making. As noted, medication and medication management may require opiate therapy in excess of a routine less than 30 day medication requirement. The patient may require daily opiate therapy necessitating month-long prescription medication as noted above in order to perform activities of daily living and achieve acceptable quality of life with respect to their chronic pain condition for the foreseeable future. We monitor our patient's carefully through drug monitoring, medication counts, urine drug testing specific to their medication as well as a myriad of other substances and with frequent follow-ups with interval reassement of the chronic pain condition, its pathophysiology and prognosis.      The level of clinical decision making at this office visit is high due to high risks and complications including mortality and morbidity related to acute and chronic pain with respects to life, bodily function, and treatment. Risks and clinical decisions with respect to under treatment, failure to maintain adequate treatment, and/or overtreatment complications and outcomes were discussed with the patient with respect to their chronic pain conditions, interventional therapies, as well as the use of various medications including possible controlled/dangerous medications. The amount and complexity of reviewed data at this in subsequent office visits is high given patient's fluctuating clinical presentation, laboratory and radiographic reports, prescription monitoring program data, and medication history as well as other relevant data as noted above. Pertinent negatives and positives data was used in consideration for the above-mentioned high complexity.       Given the patient's total MED, general use of daily opiates, or other coadministered medications in various classes  the patient was offered a prescription for Narcan. I instructed the patient that it is important that patient fill this medication in order to demonstrate understanding of the gravity of possible side effects including respiratory depression and risk of overdose of this opiate load or medication combination. As such patient will be required to bring Narcan prescription to follow-up appointments as part of compliance with continued opiate care.      With respect to opiate induced constipation I discussed multiple ways to combat this problem including staying hydrated and taking over-the-counter medications such as Dulcolax, Miralax and Senna. If these treatments are not effective we could consider such medications as Amitiza, Linzess and Movantik.      Physical Exam  Vitals and nursing note reviewed.   Constitutional:       Appearance: Normal appearance. No acte distress.  HENT:      Head: Normocephalic and atraumatic.   Eyes:      Conjunctiva/sclera: Conjunctivae normal.   Cardiovascular:      Pulses: Normal pulses.   Pulmonary:      Effort: Pulmonary effort is normal. No respiratory distress.   Musculoskeletal:      Right lower leg: No edema.      Left lower leg: No edema.   Skin:     General: Skin is warm and dry.   Neurological:      Mental Status: Alert and oriented to person, place, and time.      Cranial Nerves: No cranial nerve deficit.      Motor: No weakness.      Gait: Gait normal. Cane for ambulation.  Psychiatric:         Behavior: Behavior normal.     DATE OF EXAM: Apr 23 2024  5:42PM      [facility]   0303  -  MRI LUMBAR SPINE WO IVCON  / ACCESSION #  [id number]     PROCEDURE REASON: low back pain, sciatica          * * * * Physician Interpretation * * * *      EXAMINATION:  MRI LUMBAR SPINE WO IVCON     CLINICAL HISTORY:  low back pain, sciatica       TECHNIQUE: Routine lumbosacral spine MR protocol without gadolinium.     MQ:  [id number]     COMPARISON: None.       RESULT:       Counting reference:   Lumbosacral junction.  For the purposes of this   report,  L4-5 is considered the level of the iliac crest and assume there   are 5 lumbar-type vertebrae.  Anatomic variant:  None.     Localizer images:  No additional findings.     Alignment:    Levocurvature, apex L3.  Grade 1 anterolisthesis of L2 on   L3.     Bone marrow signal/fracture:  No evidence of pathologic marrow   infiltration.  No evidence of prior fracture.     Conus:  The conus is within normal limits of signal intensity and   morphology.     Paraspinal soft tissues:   Paraspinal soft tissues are within normal   limits.     Lower thoracic spine:  Visualized lower thoracic canal and foramina are   patent.  Small perineural cysts at the left and right lower thoracic   foramen.     L1-L2:   Moderate spinal canal stenosis secondary to disc bulging and   endplate osteophytic spurring as well as hypertrophic degenerative facet   arthrosis and ligamentum flavum thickening.  Foramina are patent.     L2-L3:    Severe spinal canal stenosis secondary to disc bulging/disc   uncovering, endplate osteophytic changes and hypertrophic degenerative   facet arthrosis with ligamentum flavum thickening.  Moderate to severe   right and moderate left subarticular recess effacement.  Mild left and no   significant right foraminal stenosis.     L3-L4:    Mild to moderate spinal canal stenosis secondary to disc   bulging and endplate osteophytic spurring as well as right greater than   left degenerative facet arthrosis.  Mild right subarticular recess   effacement.  Mild right and no significant left foraminal stenosis.     L4-L5:    Mild spinal canal stenosis secondary to broad-based disc   bulging and endplate osteophytic spurring.  Extension of disc bulging   into both inferior foramen contributing to mild bilateral foraminal   stenosis.  Mild left subarticular recess effacement.     L5-S1:    Spinal canal is patent.  Shallow disc bulging slightly flattens   the ventral  thecal sac contacting the traversing S1 nerve roots.    Moderate degenerative facet arthrosis.  Mild left greater than right   foraminal stenosis.     Sacrum and iliac wings:   Mild degenerative changes of the image   sacroiliac joints.     Encounter Diagnoses   Name Primary?    Spinal stenosis of lumbar region with neurogenic claudication     Lumbar radiculitis     Encounter for long-term (current) use of high-risk medication Yes       Plan  Continue plan of care for chronic pain  Norco and Robaxin as currently prescribed  UDS is up to date  CSA updated today  RTC 3 mos or as needed    Lindsay Méndez, APRN-CNP                            [1]   Patient Active Problem List  Diagnosis    Abnormal EKG    Abnormal urine    Adnexal cyst    Age-related osteoporosis without current pathological fracture    Neurogenic claudication due to lumbar spinal stenosis    Basal cell carcinoma of skin of nose    Benign hypertension    Chest pressure    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    Chronic kidney disease, stage 3a (Multi)    Degeneration of L4-L5 intervertebral disc    Endometrial carcinoma    Femoral acetabular impingement    Gastroesophageal reflux disease without esophagitis    Myofascial pain syndrome    Pain syndrome, chronic    Paroxysmal atrial fibrillation (Multi)    Personal history of other malignant neoplasm of skin    Pure hypercholesterolemia    Lumbar canal stenosis    Lumbar radiculitis    Sciatica of left side    Spondylosis of lumbar spine    Encounter for follow-up surveillance of uterine cancer   [2]   Past Medical History:  Diagnosis Date    Endometrial cancer (Multi)    [3]   Past Surgical History:  Procedure Laterality Date     SECTION, CLASSIC  2013     Section    HYSTERECTOMY      SHOULDER SURGERY  2013    Shoulder Surgery   [4] No family history on file.  [5]   Social History  Tobacco Use    Smoking status: Never    Smokeless tobacco: Never   Substance  Use Topics    Alcohol use: Never    Drug use: Never   [6] No Known Allergies  [7]   Current Outpatient Medications   Medication Sig Dispense Refill    HYDROcodone-acetaminophen (Norco) 5-325 mg tablet Take 1 tablet by mouth 3 times a day as needed for severe pain (7 - 10). Do not fill before May 27, 2025. 84 tablet 0    HYDROcodone-acetaminophen (Norco) 5-325 mg tablet Take 1 tablet by mouth 3 times a day as needed for severe pain (7 - 10). Do not fill before April 29, 2025. 84 tablet 0    ketorolac (Acular) 0.5 % ophthalmic solution       methocarbamol (Robaxin) 750 mg tablet Take 1 tablet (750 mg) by mouth 3 times a day. (Patient taking differently: Take 1 tablet (750 mg) by mouth 3 times a day. Takes PRN)      naloxone (Narcan) 4 mg/0.1 mL nasal spray Administer 1 spray (4 mg) into affected nostril(s) if needed for opioid reversal. May repeat every 2-3 minutes if needed, alternating nostrils, until medical assistance becomes available. 2 each 0    prednisoLONE acetate (Pred-Forte) 1 % ophthalmic suspension       calcium carbonate-vitamin D3 600 mg-5 mcg (200 unit) tablet Take 1 tablet by mouth twice a day.      dapagliflozin propanediol (Farxiga) 10 mg Take 1 tablet (10 mg) by mouth once daily. 90 tablet 3    furosemide (Lasix) 20 mg tablet TAKE ONE TABLET BY MOUTH DAILY 30 tablet 11    glucosamine-chondroit-vit C-Mn 500-400 mg capsule Take 1 capsule by mouth twice a day.      HYDROcodone-acetaminophen (Norco) 5-325 mg tablet Take 1 tablet by mouth 3 times a day as needed for severe pain (7 - 10) for up to 28 days. Do not fill before April 1, 2025. 84 tablet 0    magnesium gluconate (Magonate) 27.5 mg magne- sium (500 mg) tablet Take 2 tablets (55 mg) by mouth twice a day.      methylPREDNISolone (Medrol Dospak) 4 mg tablets TAKE AS DIRECTED ON PACK. TAKE WITH FOOD.      metoprolol tartrate (Lopressor) 50 mg tablet TAKE 1 TABLET BY MOUTH TWO TIMES A  tablet 3    multivitamin tablet Take by mouth.       mupirocin (Bactroban) 2 % ointment apply to the affected area(s) twice daily      pantoprazole (ProtoNix) 40 mg EC tablet Take 1 tablet (40 mg) by mouth once daily. 90 tablet 3    predniSONE (Deltasone) 20 mg tablet Take one tab po bid for 5 days 10 tablet 0    risedronate (Actonel) 35 mg tablet Take 1 tablet by mouth once a week in morning with full glass of water on an empty stomach. No food, drink, meds, or lying down for 30 minutes after. 12 tablet 3    risedronate (Actonel) 35 mg tablet Take 1 tablet (35mg) by mouth once a week in morning with full glass of water on an empty stomach. No food, drink, meds, or lying down for 30 minutes after. 12 tablet 3    rivaroxaban (Xarelto) 20 mg tablet Take 1 tablet (20 mg) by mouth once daily. Take with food. 90 tablet 3    spironolactone (Aldactone) 50 mg tablet Take 1 tablet (50mg) by mouth once daily 90 tablet 3     No current facility-administered medications for this visit.

## 2025-06-25 ENCOUNTER — PHARMACY VISIT (OUTPATIENT)
Dept: PHARMACY | Facility: CLINIC | Age: 85
End: 2025-06-25
Payer: COMMERCIAL

## 2025-08-06 PROCEDURE — RXMED WILLOW AMBULATORY MEDICATION CHARGE

## 2025-08-07 ENCOUNTER — PHARMACY VISIT (OUTPATIENT)
Dept: PHARMACY | Facility: CLINIC | Age: 85
End: 2025-08-07
Payer: COMMERCIAL

## 2025-09-09 ENCOUNTER — APPOINTMENT (OUTPATIENT)
Dept: PAIN MEDICINE | Facility: CLINIC | Age: 85
End: 2025-09-09
Payer: MEDICARE

## 2025-09-16 ENCOUNTER — APPOINTMENT (OUTPATIENT)
Dept: GYNECOLOGIC ONCOLOGY | Facility: CLINIC | Age: 85
End: 2025-09-16
Payer: MEDICARE

## 2025-12-01 ENCOUNTER — APPOINTMENT (OUTPATIENT)
Dept: CARDIOLOGY | Facility: CLINIC | Age: 85
End: 2025-12-01
Payer: MEDICARE